# Patient Record
Sex: FEMALE | Race: WHITE | NOT HISPANIC OR LATINO
[De-identification: names, ages, dates, MRNs, and addresses within clinical notes are randomized per-mention and may not be internally consistent; named-entity substitution may affect disease eponyms.]

---

## 2018-07-29 ENCOUNTER — TRANSCRIPTION ENCOUNTER (OUTPATIENT)
Age: 73
End: 2018-07-29

## 2020-10-26 ENCOUNTER — TRANSCRIPTION ENCOUNTER (OUTPATIENT)
Age: 75
End: 2020-10-26

## 2020-10-31 ENCOUNTER — INPATIENT (INPATIENT)
Facility: HOSPITAL | Age: 75
LOS: 1 days | Discharge: HOME HEALTH | DRG: 552 | End: 2020-11-02
Attending: INTERNAL MEDICINE
Payer: COMMERCIAL

## 2020-10-31 VITALS
OXYGEN SATURATION: 98 % | HEIGHT: 63 IN | WEIGHT: 126.99 LBS | RESPIRATION RATE: 20 BRPM | DIASTOLIC BLOOD PRESSURE: 100 MMHG | HEART RATE: 81 BPM | SYSTOLIC BLOOD PRESSURE: 154 MMHG | TEMPERATURE: 99 F

## 2020-10-31 DIAGNOSIS — S32.9XXA FRACTURE OF UNSPECIFIED PARTS OF LUMBOSACRAL SPINE AND PELVIS, INITIAL ENCOUNTER FOR CLOSED FRACTURE: ICD-10-CM

## 2020-10-31 LAB
ALBUMIN SERPL ELPH-MCNC: 3.5 G/DL — SIGNIFICANT CHANGE UP (ref 3.3–5)
ALP SERPL-CCNC: 75 U/L — SIGNIFICANT CHANGE UP (ref 40–120)
ALT FLD-CCNC: 49 U/L — SIGNIFICANT CHANGE UP (ref 12–78)
ANION GAP SERPL CALC-SCNC: 8 MMOL/L — SIGNIFICANT CHANGE UP (ref 5–17)
APTT BLD: 31.6 SEC — SIGNIFICANT CHANGE UP (ref 27.5–35.5)
AST SERPL-CCNC: 40 U/L — HIGH (ref 15–37)
BASOPHILS # BLD AUTO: 0.04 K/UL — SIGNIFICANT CHANGE UP (ref 0–0.2)
BASOPHILS NFR BLD AUTO: 0.3 % — SIGNIFICANT CHANGE UP (ref 0–2)
BILIRUB SERPL-MCNC: 0.5 MG/DL — SIGNIFICANT CHANGE UP (ref 0.2–1.2)
BUN SERPL-MCNC: 15 MG/DL — SIGNIFICANT CHANGE UP (ref 7–23)
CALCIUM SERPL-MCNC: 9.4 MG/DL — SIGNIFICANT CHANGE UP (ref 8.5–10.1)
CHLORIDE SERPL-SCNC: 108 MMOL/L — SIGNIFICANT CHANGE UP (ref 96–108)
CO2 SERPL-SCNC: 26 MMOL/L — SIGNIFICANT CHANGE UP (ref 22–31)
CREAT SERPL-MCNC: 0.68 MG/DL — SIGNIFICANT CHANGE UP (ref 0.5–1.3)
EOSINOPHIL # BLD AUTO: 0.02 K/UL — SIGNIFICANT CHANGE UP (ref 0–0.5)
EOSINOPHIL NFR BLD AUTO: 0.2 % — SIGNIFICANT CHANGE UP (ref 0–6)
GLUCOSE SERPL-MCNC: 95 MG/DL — SIGNIFICANT CHANGE UP (ref 70–99)
HCT VFR BLD CALC: 40.6 % — SIGNIFICANT CHANGE UP (ref 34.5–45)
HGB BLD-MCNC: 13.5 G/DL — SIGNIFICANT CHANGE UP (ref 11.5–15.5)
IMM GRANULOCYTES NFR BLD AUTO: 0.8 % — SIGNIFICANT CHANGE UP (ref 0–1.5)
INR BLD: 1.05 RATIO — SIGNIFICANT CHANGE UP (ref 0.88–1.16)
LYMPHOCYTES # BLD AUTO: 0.84 K/UL — LOW (ref 1–3.3)
LYMPHOCYTES # BLD AUTO: 6.5 % — LOW (ref 13–44)
MCHC RBC-ENTMCNC: 30 PG — SIGNIFICANT CHANGE UP (ref 27–34)
MCHC RBC-ENTMCNC: 33.3 GM/DL — SIGNIFICANT CHANGE UP (ref 32–36)
MCV RBC AUTO: 90.2 FL — SIGNIFICANT CHANGE UP (ref 80–100)
MONOCYTES # BLD AUTO: 0.51 K/UL — SIGNIFICANT CHANGE UP (ref 0–0.9)
MONOCYTES NFR BLD AUTO: 3.9 % — SIGNIFICANT CHANGE UP (ref 2–14)
NEUTROPHILS # BLD AUTO: 11.41 K/UL — HIGH (ref 1.8–7.4)
NEUTROPHILS NFR BLD AUTO: 88.3 % — HIGH (ref 43–77)
PLATELET # BLD AUTO: 284 K/UL — SIGNIFICANT CHANGE UP (ref 150–400)
POTASSIUM SERPL-MCNC: 3.8 MMOL/L — SIGNIFICANT CHANGE UP (ref 3.5–5.3)
POTASSIUM SERPL-SCNC: 3.8 MMOL/L — SIGNIFICANT CHANGE UP (ref 3.5–5.3)
PROT SERPL-MCNC: 7.6 GM/DL — SIGNIFICANT CHANGE UP (ref 6–8.3)
PROTHROM AB SERPL-ACNC: 12.2 SEC — SIGNIFICANT CHANGE UP (ref 10.6–13.6)
RBC # BLD: 4.5 M/UL — SIGNIFICANT CHANGE UP (ref 3.8–5.2)
RBC # FLD: 12.6 % — SIGNIFICANT CHANGE UP (ref 10.3–14.5)
SARS-COV-2 RNA SPEC QL NAA+PROBE: SIGNIFICANT CHANGE UP
SODIUM SERPL-SCNC: 142 MMOL/L — SIGNIFICANT CHANGE UP (ref 135–145)
TROPONIN I SERPL-MCNC: <0.015 NG/ML — SIGNIFICANT CHANGE UP (ref 0.01–0.04)
WBC # BLD: 12.92 K/UL — HIGH (ref 3.8–10.5)
WBC # FLD AUTO: 12.92 K/UL — HIGH (ref 3.8–10.5)

## 2020-10-31 PROCEDURE — 97162 PT EVAL MOD COMPLEX 30 MIN: CPT | Mod: GP

## 2020-10-31 PROCEDURE — 84100 ASSAY OF PHOSPHORUS: CPT

## 2020-10-31 PROCEDURE — 85025 COMPLETE CBC W/AUTO DIFF WBC: CPT

## 2020-10-31 PROCEDURE — 97116 GAIT TRAINING THERAPY: CPT | Mod: GP

## 2020-10-31 PROCEDURE — 36415 COLL VENOUS BLD VENIPUNCTURE: CPT

## 2020-10-31 PROCEDURE — 97530 THERAPEUTIC ACTIVITIES: CPT | Mod: GP

## 2020-10-31 PROCEDURE — 86769 SARS-COV-2 COVID-19 ANTIBODY: CPT

## 2020-10-31 PROCEDURE — 84484 ASSAY OF TROPONIN QUANT: CPT

## 2020-10-31 PROCEDURE — 72192 CT PELVIS W/O DYE: CPT | Mod: 26

## 2020-10-31 PROCEDURE — 83735 ASSAY OF MAGNESIUM: CPT

## 2020-10-31 PROCEDURE — 80053 COMPREHEN METABOLIC PANEL: CPT

## 2020-10-31 PROCEDURE — 93005 ELECTROCARDIOGRAM TRACING: CPT

## 2020-10-31 PROCEDURE — 99223 1ST HOSP IP/OBS HIGH 75: CPT | Mod: AI

## 2020-10-31 PROCEDURE — 71045 X-RAY EXAM CHEST 1 VIEW: CPT | Mod: 26

## 2020-10-31 PROCEDURE — 76376 3D RENDER W/INTRP POSTPROCES: CPT | Mod: 26

## 2020-10-31 PROCEDURE — 73502 X-RAY EXAM HIP UNI 2-3 VIEWS: CPT | Mod: 26,RT

## 2020-10-31 PROCEDURE — 73552 X-RAY EXAM OF FEMUR 2/>: CPT | Mod: 26,RT

## 2020-10-31 RX ORDER — LEVOTHYROXINE SODIUM 125 MCG
75 TABLET ORAL DAILY
Refills: 0 | Status: DISCONTINUED | OUTPATIENT
Start: 2020-10-31 | End: 2020-11-02

## 2020-10-31 RX ORDER — LEVOTHYROXINE SODIUM 125 MCG
1 TABLET ORAL
Qty: 0 | Refills: 0 | DISCHARGE

## 2020-10-31 RX ORDER — ONDANSETRON 8 MG/1
4 TABLET, FILM COATED ORAL ONCE
Refills: 0 | Status: COMPLETED | OUTPATIENT
Start: 2020-10-31 | End: 2020-10-31

## 2020-10-31 RX ORDER — ENOXAPARIN SODIUM 100 MG/ML
40 INJECTION SUBCUTANEOUS DAILY
Refills: 0 | Status: DISCONTINUED | OUTPATIENT
Start: 2020-10-31 | End: 2020-11-02

## 2020-10-31 RX ORDER — SODIUM CHLORIDE 9 MG/ML
1000 INJECTION INTRAMUSCULAR; INTRAVENOUS; SUBCUTANEOUS ONCE
Refills: 0 | Status: COMPLETED | OUTPATIENT
Start: 2020-10-31 | End: 2020-10-31

## 2020-10-31 RX ORDER — HYDROMORPHONE HYDROCHLORIDE 2 MG/ML
0.5 INJECTION INTRAMUSCULAR; INTRAVENOUS; SUBCUTANEOUS ONCE
Refills: 0 | Status: DISCONTINUED | OUTPATIENT
Start: 2020-10-31 | End: 2020-10-31

## 2020-10-31 RX ORDER — ACETAMINOPHEN 500 MG
1000 TABLET ORAL EVERY 8 HOURS
Refills: 0 | Status: DISCONTINUED | OUTPATIENT
Start: 2020-10-31 | End: 2020-11-02

## 2020-10-31 RX ADMIN — HYDROMORPHONE HYDROCHLORIDE 0.5 MILLIGRAM(S): 2 INJECTION INTRAMUSCULAR; INTRAVENOUS; SUBCUTANEOUS at 13:05

## 2020-10-31 RX ADMIN — HYDROMORPHONE HYDROCHLORIDE 0.5 MILLIGRAM(S): 2 INJECTION INTRAMUSCULAR; INTRAVENOUS; SUBCUTANEOUS at 12:50

## 2020-10-31 RX ADMIN — SODIUM CHLORIDE 1000 MILLILITER(S): 9 INJECTION INTRAMUSCULAR; INTRAVENOUS; SUBCUTANEOUS at 12:50

## 2020-10-31 RX ADMIN — Medication 1000 MILLIGRAM(S): at 20:05

## 2020-10-31 RX ADMIN — Medication 1000 MILLIGRAM(S): at 20:06

## 2020-10-31 RX ADMIN — SODIUM CHLORIDE 1000 MILLILITER(S): 9 INJECTION INTRAMUSCULAR; INTRAVENOUS; SUBCUTANEOUS at 13:50

## 2020-10-31 RX ADMIN — ONDANSETRON 4 MILLIGRAM(S): 8 TABLET, FILM COATED ORAL at 12:51

## 2020-10-31 RX ADMIN — ENOXAPARIN SODIUM 40 MILLIGRAM(S): 100 INJECTION SUBCUTANEOUS at 20:06

## 2020-10-31 NOTE — ED PROVIDER NOTE - MUSCULOSKELETAL, MLM
Spine appears normal, range of motion is not limited, no muscle or joint tenderness Spine appears normal, range of motion is not limited, +tender right hip, no LS tenderness

## 2020-10-31 NOTE — CONSULT NOTE ADULT - SUBJECTIVE AND OBJECTIVE BOX
Patient is a 75yFemale community ambulator with assistive devices who presents to ED w/ a c/o of right hip pain. Patient states she slipped on ice walking her dog this morning. Denies HH/LOC. States ability/inability to walk immediately following the injury. Denies any numbness or tingling. Denies having any other pain elsewhere. Denies any previous orthopedic history. No other orthopedic concerns at this time.            penicillins (Unknown)      PHYSICAL EXAM:  T(C): 37 (10-31-20 @ 15:31), Max: 37.1 (10-31-20 @ 11:48)  HR: 70 (10-31-20 @ 15:31) (70 - 81)  BP: 114/67 (10-31-20 @ 15:31) (114/67 - 154/100)  RR: 17 (10-31-20 @ 15:31) (17 - 20)  SpO2: 100% (10-31-20 @ 15:31) (98% - 100%)    Gen: NAD, Resting comfortably    RLE  Skin intact, no erythema or ecchymosis  No bony tenderness to palpation  +EHL/FHL/TA/GSC  +SILT L3-S1  + DP  Compartments soft and compressible  No calf tenderness    Secondary Exam: Benign, Skin intact, NTTP along axial spine, SILT throughout, motor grossly intact throughout, no other orthopedic injuries at this time, compartments soft and compressible    xrays show right inferior pubic rami fracture minimally displaced, and right ilium sclerotic lesion      A/P: 75F w/ nondisplaced inferior pubic rami fx and incidental sclerotic appearing lesion      Analgesia  WBAT  DVT ppx  PT/OT  recommend out patient workup for sclerotic lesion with no acute orthopedic surgical intervention indicated at this time  please call office for an appointment  Orthopedically stable  Discussed with attending who is in agreement with above plan   Patient is a 75yFemale community ambulator with assistive devices who presents to ED w/ a c/o of right hip pain. Patient states she slipped on ice walking her dog this morning. Denies HH/LOC. States ability/inability to walk immediately following the injury. Denies any numbness or tingling. Denies having any other pain elsewhere. Denies any previous orthopedic history. No other orthopedic concerns at this time.            penicillins (Unknown)      PHYSICAL EXAM:  T(C): 37 (10-31-20 @ 15:31), Max: 37.1 (10-31-20 @ 11:48)  HR: 70 (10-31-20 @ 15:31) (70 - 81)  BP: 114/67 (10-31-20 @ 15:31) (114/67 - 154/100)  RR: 17 (10-31-20 @ 15:31) (17 - 20)  SpO2: 100% (10-31-20 @ 15:31) (98% - 100%)    Gen: NAD, Resting comfortably    RLE  Skin intact, no erythema or ecchymosis  No bony tenderness to palpation  +EHL/FHL/TA/GSC  +SILT L3-S1  + DP  Compartments soft and compressible  No calf tenderness    Secondary Exam: Benign, Skin intact, NTTP along axial spine, SILT throughout, motor grossly intact throughout, no other orthopedic injuries at this time, compartments soft and compressible    xrays show right inferior pubic rami fracture minimally displaced, right sacral insufficiency fx and right ilium sclerotic lesion      A/P: 75F w/ nondisplaced right LC1 fx and incidental sclerotic appearing lesion      Analgesia  WBAT  DVT ppx  PT/OT  recommend out patient workup for sclerotic lesion with no acute orthopedic surgical intervention indicated at this time  please call office for an appointment  Orthopedically stable  Discussed with attending who is in agreement with above plan

## 2020-10-31 NOTE — H&P ADULT - NSHPPHYSICALEXAM_GEN_ALL_CORE
Objective:    Vitals:  T(C): 37 (10-31-20 @ 15:31), Max: 37.1 (10-31-20 @ 11:48)  HR: 70 (10-31-20 @ 15:31) (70 - 81)  BP: 114/67 (10-31-20 @ 15:31) (114/67 - 154/100)  RR: 17 (10-31-20 @ 15:31) (17 - 20)  SpO2: 100% (10-31-20 @ 15:31) (98% - 100%)    Physical Exam:  General: comfortable, no acute distress, well nourished  HEENT: Atraumatic, no LAD, trachea midline, PERRLA  Cardiovascular: normal s1s2, no murmurs, gallops or fricition rubs  Pulmonary: clear to ausculation Bilaterally, no wheezing , rhonchi  Gastrointestinal: soft non tender non distended, no masses felt, no organomegally  Muscloskeletal: no lower extremity edema, intact bilateral lower extremity pulses  Neurological: CN II-12 intact. No focal weakness  Psychiatrical: normal mood, cooperative  SKIN: no rash, lesions or ulcers

## 2020-10-31 NOTE — ED ADULT NURSE NOTE - OBJECTIVE STATEMENT
c/o trip and fall on ice this morning while walking dog, patient denies hitting head, does not take anticoagulants, landed on buttock, c/o right buttock pain radiating to right groin, unable to bare weight  HX: hypothyroidism

## 2020-10-31 NOTE — H&P ADULT - NSHPLABSRESULTS_GEN_ALL_CORE
Labs:                          13.5   12.92 )-----------( 284      ( 31 Oct 2020 12:37 )             40.6     10-31    142  |  108  |  15  ----------------------------<  95  3.8   |  26  |  0.68    Ca    9.4      31 Oct 2020 12:37    TPro  7.6  /  Alb  3.5  /  TBili  0.5  /  DBili  x   /  AST  40<H>  /  ALT  49  /  AlkPhos  75  10-31    LIVER FUNCTIONS - ( 31 Oct 2020 12:37 )  Alb: 3.5 g/dL / Pro: 7.6 gm/dL / ALK PHOS: 75 U/L / ALT: 49 U/L / AST: 40 U/L / GGT: x           PT/INR - ( 31 Oct 2020 12:37 )   PT: 12.2 sec;   INR: 1.05 ratio         PTT - ( 31 Oct 2020 12:37 )  PTT:31.6 sec      Active Medications  MEDICATIONS  (STANDING):  enoxaparin Injectable 40 milliGRAM(s) SubCutaneous daily  levothyroxine 75 MICROGram(s) Oral daily    MEDICATIONS  (PRN):

## 2020-10-31 NOTE — ED PROVIDER NOTE - CLINICAL SUMMARY MEDICAL DECISION MAKING FREE TEXT BOX
Pt with history of thyroid disease here after mechanical fall from standing height onto buttocks, hip. Pt unable to walk after fall at 7AM. XR, pain medications, labs, ortho consult probably.

## 2020-10-31 NOTE — CONSULT NOTE ADULT - CONSULT REQUESTED BY NAME
ED Detail Level: Detailed Post-Care Instructions: I reviewed with the patient in detail post-care instructions. Patient is to wear sunprotection, and avoid picking at any of the treated lesions. Pt may apply Vaseline to crusted or scabbing areas. Render Note In Bullet Format When Appropriate: No Number Of Freeze-Thaw Cycles: 2 freeze-thaw cycles Consent: The patient's consent was obtained including but not limited to risks of crusting, scabbing, blistering, scarring, darker or lighter pigmentary change, recurrence, incomplete removal and infection. Render Post-Care Instructions In Note?: yes Duration Of Freeze Thaw-Cycle (Seconds): 3

## 2020-10-31 NOTE — ED ADULT NURSE NOTE - NSIMPLEMENTINTERV_GEN_ALL_ED
Implemented All Fall Risk Interventions:  [place] to call system. Call bell, personal items and telephone within reach. Instruct patient to call for assistance. Room bathroom lighting operational. Non-slip footwear when patient is off stretcher. Physically safe environment: no spills, clutter or unnecessary equipment. Stretcher in lowest position, wheels locked, appropriate side rails in place. Provide visual cue, wrist band, yellow gown, etc. Monitor gait and stability. Monitor for mental status changes and reorient to person, place, and time. Review medications for side effects contributing to fall risk. Reinforce activity limits and safety measures with patient and family.

## 2020-10-31 NOTE — ED PROVIDER NOTE - OBJECTIVE STATEMENT
74 y/o female with PMHx of hypothyroidism presents to the ED s/p trip and fall at 7:15 while taking her dog out. Pt fell backwards, slipping onto ice, c/o back pain, radiating to buttock and groin, unable to ambulate s/p fall. No use of blood thinners. Unable to bear weight at this time. Denies LOC, head trauma. Pt lives at home with daughter and son-in-law. PMD: Dr. Fernandez, cardiologist: Dr. Martel. 76 y/o female with PMHx of hypothyroidism presents to the ED s/p trip and fall at 7:15 while taking her dog out. Pt fell backwards, slipping onto ice, c/o back pain, radiating to buttock and groin, unable to ambulate s/p fall. No use of blood thinners. Unable to bear weight at this time. Denies LOC, head trauma. Pt lives at home with daughter and son-in-law. PMD: Dr. Fernandez, cardiologist: Dr. Martel. Non-smoker, drinker, drug user.

## 2020-10-31 NOTE — ED ADULT TRIAGE NOTE - CHIEF COMPLAINT QUOTE
PT A/OX3, bibems from home c/o "I tripped and fell, I landed on my butt and back". pt denies LOC/HS, denies taking blood thinners. pt reports unable to ambulate s/p fall due to pain in buttocks.

## 2020-10-31 NOTE — ED ADULT TRIAGE NOTE - NS ED NURSE BANDS TYPE
Problem: Falls - Risk of:  Goal: Will remain free from falls  Description: Will remain free from falls  Outcome: Ongoing  Goal: Absence of physical injury  Description: Absence of physical injury  Outcome: Ongoing     Problem: Nutrition  Goal: Optimal nutrition therapy  Outcome: Ongoing     Problem: Mobility - Impaired:  Goal: Mobility will improve  Description: Mobility will improve  Outcome: Ongoing     Problem: Pain:  Goal: Pain level will decrease  Description: Pain level will decrease  Outcome: Ongoing  Goal: Control of acute pain  Description: Control of acute pain  Outcome: Ongoing  Goal: Control of chronic pain  Description: Control of chronic pain  Outcome: Ongoing Name band;

## 2020-10-31 NOTE — H&P ADULT - HISTORY OF PRESENT ILLNESS
74 y/o female with PMHx of hypothyroidism presents to the ED s/p trip and fall at 7:15 while taking her dog out. Pt fell backwards, slipping onto ice, c/o back pain, radiating to buttock and groin, unable to ambulate s/p fall. No use of blood thinners. Unable to bear weight at this time. Denies LOC, head trauma. Pt lives at home with daughter and son-in-law. PMD: Dr. Fernandez, cardiologist: Dr. Martel. Non-smoker, drinker, drug user.    ER course  Vitals  Imaging  labs  Medications: zofran 4mg IV x 1 , Dilaudid .5iv x 1 , NS 1liter

## 2020-11-01 LAB
ALBUMIN SERPL ELPH-MCNC: 2.8 G/DL — LOW (ref 3.3–5)
ALP SERPL-CCNC: 61 U/L — SIGNIFICANT CHANGE UP (ref 40–120)
ALT FLD-CCNC: 33 U/L — SIGNIFICANT CHANGE UP (ref 12–78)
ANION GAP SERPL CALC-SCNC: 4 MMOL/L — LOW (ref 5–17)
AST SERPL-CCNC: 21 U/L — SIGNIFICANT CHANGE UP (ref 15–37)
BASOPHILS # BLD AUTO: 0.05 K/UL — SIGNIFICANT CHANGE UP (ref 0–0.2)
BASOPHILS NFR BLD AUTO: 0.7 % — SIGNIFICANT CHANGE UP (ref 0–2)
BILIRUB SERPL-MCNC: 0.6 MG/DL — SIGNIFICANT CHANGE UP (ref 0.2–1.2)
BUN SERPL-MCNC: 19 MG/DL — SIGNIFICANT CHANGE UP (ref 7–23)
CALCIUM SERPL-MCNC: 8.8 MG/DL — SIGNIFICANT CHANGE UP (ref 8.5–10.1)
CHLORIDE SERPL-SCNC: 109 MMOL/L — HIGH (ref 96–108)
CO2 SERPL-SCNC: 28 MMOL/L — SIGNIFICANT CHANGE UP (ref 22–31)
CREAT SERPL-MCNC: 0.76 MG/DL — SIGNIFICANT CHANGE UP (ref 0.5–1.3)
EOSINOPHIL # BLD AUTO: 0.12 K/UL — SIGNIFICANT CHANGE UP (ref 0–0.5)
EOSINOPHIL NFR BLD AUTO: 1.7 % — SIGNIFICANT CHANGE UP (ref 0–6)
GLUCOSE SERPL-MCNC: 89 MG/DL — SIGNIFICANT CHANGE UP (ref 70–99)
HCT VFR BLD CALC: 35.4 % — SIGNIFICANT CHANGE UP (ref 34.5–45)
HCV AB S/CO SERPL IA: 0.09 S/CO — SIGNIFICANT CHANGE UP (ref 0–0.99)
HCV AB SERPL-IMP: SIGNIFICANT CHANGE UP
HGB BLD-MCNC: 11.7 G/DL — SIGNIFICANT CHANGE UP (ref 11.5–15.5)
IMM GRANULOCYTES NFR BLD AUTO: 0.6 % — SIGNIFICANT CHANGE UP (ref 0–1.5)
LYMPHOCYTES # BLD AUTO: 1.25 K/UL — SIGNIFICANT CHANGE UP (ref 1–3.3)
LYMPHOCYTES # BLD AUTO: 17.7 % — SIGNIFICANT CHANGE UP (ref 13–44)
MAGNESIUM SERPL-MCNC: 2.2 MG/DL — SIGNIFICANT CHANGE UP (ref 1.6–2.6)
MCHC RBC-ENTMCNC: 30 PG — SIGNIFICANT CHANGE UP (ref 27–34)
MCHC RBC-ENTMCNC: 33.1 GM/DL — SIGNIFICANT CHANGE UP (ref 32–36)
MCV RBC AUTO: 90.8 FL — SIGNIFICANT CHANGE UP (ref 80–100)
MONOCYTES # BLD AUTO: 0.43 K/UL — SIGNIFICANT CHANGE UP (ref 0–0.9)
MONOCYTES NFR BLD AUTO: 6.1 % — SIGNIFICANT CHANGE UP (ref 2–14)
NEUTROPHILS # BLD AUTO: 5.16 K/UL — SIGNIFICANT CHANGE UP (ref 1.8–7.4)
NEUTROPHILS NFR BLD AUTO: 73.2 % — SIGNIFICANT CHANGE UP (ref 43–77)
PHOSPHATE SERPL-MCNC: 2.9 MG/DL — SIGNIFICANT CHANGE UP (ref 2.5–4.5)
PLATELET # BLD AUTO: 252 K/UL — SIGNIFICANT CHANGE UP (ref 150–400)
POTASSIUM SERPL-MCNC: 3.7 MMOL/L — SIGNIFICANT CHANGE UP (ref 3.5–5.3)
POTASSIUM SERPL-SCNC: 3.7 MMOL/L — SIGNIFICANT CHANGE UP (ref 3.5–5.3)
PROT SERPL-MCNC: 6.4 GM/DL — SIGNIFICANT CHANGE UP (ref 6–8.3)
RBC # BLD: 3.9 M/UL — SIGNIFICANT CHANGE UP (ref 3.8–5.2)
RBC # FLD: 13 % — SIGNIFICANT CHANGE UP (ref 10.3–14.5)
SARS-COV-2 IGG SERPL QL IA: NEGATIVE — SIGNIFICANT CHANGE UP
SARS-COV-2 IGM SERPL IA-ACNC: 0.08 INDEX — SIGNIFICANT CHANGE UP
SODIUM SERPL-SCNC: 141 MMOL/L — SIGNIFICANT CHANGE UP (ref 135–145)
WBC # BLD: 7.05 K/UL — SIGNIFICANT CHANGE UP (ref 3.8–10.5)
WBC # FLD AUTO: 7.05 K/UL — SIGNIFICANT CHANGE UP (ref 3.8–10.5)

## 2020-11-01 PROCEDURE — 93010 ELECTROCARDIOGRAM REPORT: CPT

## 2020-11-01 PROCEDURE — 99232 SBSQ HOSP IP/OBS MODERATE 35: CPT

## 2020-11-01 RX ORDER — KETOROLAC TROMETHAMINE 30 MG/ML
30 SYRINGE (ML) INJECTION EVERY 6 HOURS
Refills: 0 | Status: DISCONTINUED | OUTPATIENT
Start: 2020-11-01 | End: 2020-11-02

## 2020-11-01 RX ADMIN — Medication 1000 MILLIGRAM(S): at 21:00

## 2020-11-01 RX ADMIN — ENOXAPARIN SODIUM 40 MILLIGRAM(S): 100 INJECTION SUBCUTANEOUS at 10:51

## 2020-11-01 RX ADMIN — Medication 30 MILLIGRAM(S): at 10:50

## 2020-11-01 RX ADMIN — Medication 1000 MILLIGRAM(S): at 05:47

## 2020-11-01 RX ADMIN — Medication 30 MILLIGRAM(S): at 00:59

## 2020-11-01 RX ADMIN — Medication 30 MILLIGRAM(S): at 17:33

## 2020-11-01 RX ADMIN — Medication 1000 MILLIGRAM(S): at 14:38

## 2020-11-01 RX ADMIN — Medication 30 MILLIGRAM(S): at 11:04

## 2020-11-01 RX ADMIN — Medication 1000 MILLIGRAM(S): at 14:37

## 2020-11-01 RX ADMIN — Medication 30 MILLIGRAM(S): at 17:45

## 2020-11-01 RX ADMIN — Medication 30 MILLIGRAM(S): at 00:34

## 2020-11-01 RX ADMIN — Medication 75 MICROGRAM(S): at 05:47

## 2020-11-01 NOTE — PROGRESS NOTE ADULT - SUBJECTIVE AND OBJECTIVE BOX
C/c: mechanical fall/inability to ambulate.    HPI:   76 y/o female with PMHx of hypothyroidism presented to the ER s/p mechanical  fall at 7:15 while taking her dog out. Pt fell backwards, slipping onto ice, c/o back pain, radiating to buttock and groin, unable to ambulate s/p fall. She was admitted with right superior/inf pubic rami and sacral fractures.     11/1: pt seen and examined this am. Felt well. Hadn't been out of bed yet. no pain at rest. no sob/chest pain.     Review of system- All 10 systems reviewed and is as per HPI otherwise negative.     VITALS  T(C): 36.7 (11-01-20 @ 09:28), Max: 37.2 (10-31-20 @ 18:55)  HR: 67 (11-01-20 @ 09:28) (65 - 81)  BP: 119/66 (11-01-20 @ 09:28) (112/59 - 154/100)  RR: 16 (11-01-20 @ 09:28) (16 - 20)  SpO2: 100% (11-01-20 @ 09:28) (97% - 100%)    PHYSICAL EXAM:    GENERAL: Comfortable, no acute distress  HEAD:  Atraumatic, Normocephalic  EYES: EOMI, PERRLA  HEENT: Moist mucous membranes  NECK: Supple, No JVD  NERVOUS SYSTEM:  Alert & Oriented X3, Motor Strength 5/5 B/L upper and lower extremities  CHEST/LUNG: Clear to auscultation bilaterally  HEART: Regular rate and rhythm; No murmurs, rubs, or gallops  ABDOMEN: Soft, Nontender, Nondistended; Bowel sounds present  GENITOURINARY- Voiding, no palpable bladder  EXTREMITIES:  No clubbing, cyanosis, or edema  MUSCULOSKELETAL- No muscle tenderness, No joint tenderness  SKIN-no rash        LABS:                        11.7   7.05  )-----------( 252                35.4     11-01    141  |  109<H>  |  19  ----------------------------<  89  3.7   |  28  |  0.76    Ca    8.8      01 Nov 2020 06:45  Phos  2.9     11-01  Mg     2.2     11-01    TPro  6.4  /  Alb  2.8<L>  /  TBili  0.6  /  DBili  x   /  AST  21  /  ALT  33  /  AlkPhos  61  11-01    PT/INR - ( 31 Oct 2020 12:37 )   PT: 12.2 sec;   INR: 1.05 ratio         PTT - ( 31 Oct 2020 12:37 )  PTT:31.6 sec      CARDIAC MARKERS ( 31 Oct 2020 19:55 )  <0.015 ng/mL / x     / x     / x     / x          MEDS  acetaminophen   Tablet .. 1000 milliGRAM(s) Oral every 8 hours  enoxaparin Injectable 40 milliGRAM(s) SubCutaneous daily  ketorolac   Injectable 30 milliGRAM(s) IV Push every 6 hours PRN  levothyroxine 75 MICROGram(s) Oral daily    ASSESSMENT AND PLAN:  75F, PMH as above a/w:    1. Mechanical fall/superior+inferior pubic rami+sacral fractures:  -pain control  -physical therapy  -ortho eval appreciated  -incentive spirometry  -bowel regimen.     2. Hypothyroidism:  -synthroid    3. Sclerotic lesion right ileum:  -outpt f/u with ortho     4. DVT px.   -lovenox.

## 2020-11-01 NOTE — PHYSICAL THERAPY INITIAL EVALUATION ADULT - PERTINENT HX OF CURRENT PROBLEM, REHAB EVAL
Patient is a 75yFemale community ambulator with assistive devices. xrays show right inferior pubic rami fracture minimally displaced, right sacral insufficiency fx and right ilium sclerotic lesion no acute orthopedic surgical intervention indicated at this time

## 2020-11-01 NOTE — PHYSICAL THERAPY INITIAL EVALUATION ADULT - ADDITIONAL COMMENTS
pt. lives in an apt attached to house with her dtr and son in-law. Pt. was Ind with all ADL's, with NAD, + drive and works full time as principle assistance. Pt. has 3 step to enter her apt and 5 inside (-) HR.

## 2020-11-01 NOTE — PHYSICAL THERAPY INITIAL EVALUATION ADULT - PLANNED THERAPY INTERVENTIONS, PT EVAL
bed mobility training/postural re-education/neuromuscular re-education/strengthening/stretching/transfer training

## 2020-11-01 NOTE — PHYSICAL THERAPY INITIAL EVALUATION ADULT - IMPAIRMENTS FOUND, PT EVAL
muscle strength/ROM/gait, locomotion, and balance/neuromotor development and sensory integration/aerobic capacity/endurance

## 2020-11-01 NOTE — PHYSICAL THERAPY INITIAL EVALUATION ADULT - CRITERIA FOR SKILLED THERAPEUTIC INTERVENTIONS
anticipated discharge recommendation/therapy frequency/anticipated equipment needs at discharge/risk reduction/prevention/rehab potential/predicted duration of therapy intervention/impairments found/functional limitations in following categories

## 2020-11-02 ENCOUNTER — TRANSCRIPTION ENCOUNTER (OUTPATIENT)
Age: 75
End: 2020-11-02

## 2020-11-02 VITALS
TEMPERATURE: 98 F | OXYGEN SATURATION: 99 % | HEART RATE: 68 BPM | SYSTOLIC BLOOD PRESSURE: 135 MMHG | RESPIRATION RATE: 16 BRPM | DIASTOLIC BLOOD PRESSURE: 66 MMHG

## 2020-11-02 PROCEDURE — 99239 HOSP IP/OBS DSCHRG MGMT >30: CPT

## 2020-11-02 RX ORDER — ACETAMINOPHEN 500 MG
2 TABLET ORAL
Qty: 0 | Refills: 0 | DISCHARGE

## 2020-11-02 RX ORDER — IBUPROFEN 200 MG
400 TABLET ORAL EVERY 6 HOURS
Refills: 0 | Status: DISCONTINUED | OUTPATIENT
Start: 2020-11-02 | End: 2020-11-02

## 2020-11-02 RX ORDER — IBUPROFEN 200 MG
1 TABLET ORAL
Qty: 0 | Refills: 0 | DISCHARGE
Start: 2020-11-02

## 2020-11-02 RX ADMIN — ENOXAPARIN SODIUM 40 MILLIGRAM(S): 100 INJECTION SUBCUTANEOUS at 10:30

## 2020-11-02 RX ADMIN — Medication 1000 MILLIGRAM(S): at 13:26

## 2020-11-02 RX ADMIN — Medication 1000 MILLIGRAM(S): at 06:27

## 2020-11-02 RX ADMIN — Medication 30 MILLIGRAM(S): at 07:00

## 2020-11-02 RX ADMIN — Medication 1000 MILLIGRAM(S): at 13:19

## 2020-11-02 RX ADMIN — Medication 1000 MILLIGRAM(S): at 06:26

## 2020-11-02 RX ADMIN — Medication 75 MICROGRAM(S): at 06:26

## 2020-11-02 RX ADMIN — Medication 400 MILLIGRAM(S): at 15:58

## 2020-11-02 RX ADMIN — Medication 30 MILLIGRAM(S): at 06:45

## 2020-11-02 NOTE — DISCHARGE NOTE PROVIDER - NSDCMRMEDTOKEN_GEN_ALL_CORE_FT
acetaminophen 500 mg oral tablet: 2 tab(s) orally every 6 hours, As Needed for pain. max dose 4g in 24 hours.  ibuprofen 400 mg oral tablet: 1 tab(s) orally every 6 hours, As needed, Moderate Pain (4 - 6)  Synthroid 75 mcg (0.075 mg) oral tablet: 1 tab(s) orally once a day

## 2020-11-02 NOTE — DISCHARGE NOTE PROVIDER - HOSPITAL COURSE
76 y/o female with PMHx of hypothyroidism presented to the ER s/p mechanical  fall at 7:15 while taking her dog out. Pt fell backwards, slipping onto ice, c/o back pain, radiating to buttock and groin, unable to ambulate s/p fall. She was admitted with right superior/inf pubic rami and sacral fractures. Seen by ortho, pain control/physical therapy was recommended. She was incidentally noted to have a sclerotic lesion of her right ilium for which was recommended outpt f/u. She was seen by physical therapy, did well. Her pain is controlled and she will be discharged home.     Vital Signs Last 24 Hrs  T(C): 36.7 (02 Nov 2020 08:44), Max: 36.7 (02 Nov 2020 08:44)  T(F): 98 (02 Nov 2020 08:44), Max: 98 (02 Nov 2020 08:44)  HR: 68 (02 Nov 2020 08:44) (63 - 68)  BP: 135/66 (02 Nov 2020 08:44) (135/66 - 139/58)  RR: 16 (02 Nov 2020 08:44) (16 - 16)  SpO2: 99% (02 Nov 2020 08:44) (98% - 99%)      PHYSICAL EXAM:    GENERAL: Comfortable, no acute distress  HEAD:  Atraumatic, Normocephalic  EYES: EOMI, PERRLA  HEENT: Moist mucous membranes  NECK: Supple, No JVD  NERVOUS SYSTEM:  Alert & Oriented X3, Motor Strength 5/5 B/L upper and lower extremities  CHEST/LUNG: Clear to auscultation bilaterally  HEART: Regular rate and rhythm; No murmurs, rubs, or gallops  ABDOMEN: Soft, Nontender, Nondistended; Bowel sounds present  GENITOURINARY- Voiding, no palpable bladder  EXTREMITIES:  No clubbing, cyanosis, or edema  MUSCULOSKELETAL- No groin pain with movement.  SKIN-no rash    LABS:                        11.7   7.05  )-----------( 252      ( 01 Nov 2020 06:45 )             35.4     11-01    141  |  109<H>  |  19  ----------------------------<  89  3.7   |  28  |  0.76    Ca    8.8      01 Nov 2020 06:45  Phos  2.9     11-01  Mg     2.2     11-01    TPro  6.4  /  Alb  2.8<L>  /  TBili  0.6  /  DBili  x   /  AST  21  /  ALT  33  /  AlkPhos  61  11-01    CT Pelvis Bony Only No Cont (10.31.20 @ 14:14) >    IMPRESSION:  1. Slightly displaced right sacral insufficiency fracture.  2. Slightly displaced right inferior pubic ramus fracture and slight nondisplaced anterior cortical fracture of the superior ramus.  3. Nonspecific 1.4 cm sclerotic focus of the right ilium. Correlation with prior imaging is suggested if available to assess stability. MRI of the sacroiliac joints may be helpful for further characterization as warranted.    FINAL DIAGNOSIS:  1. Mechanical fall/superior+inferior pubic rami+sacral fractures:  2. Hypothyroidism:  3. Sclerotic lesion right Ilium    time taken for dc 42 min  d/w pt.  left message for pcp re: dc.

## 2020-11-02 NOTE — DISCHARGE NOTE NURSING/CASE MANAGEMENT/SOCIAL WORK - PATIENT PORTAL LINK FT
You can access the FollowMyHealth Patient Portal offered by [facility] by registering at the following website: [email or web address]. By joining [facility]’s FollowMyHealth portal, you will also be able to view your health information using other applications (apps) compatible with our system.

## 2020-11-02 NOTE — DISCHARGE NOTE PROVIDER - NSDCCPCAREPLAN_GEN_ALL_CORE_FT
PRINCIPAL DISCHARGE DIAGNOSIS  Diagnosis: Pelvic fracture  Assessment and Plan of Treatment: physical therapy  pain meds as needed.   follow up with orthopedics as advised for pelvic fracture and incidentally noted right sclerotic lesion found on your ilium

## 2020-11-02 NOTE — DISCHARGE NOTE PROVIDER - CARE PROVIDER_API CALL
Ignacio Carbajal  ORTHOPAEDIC SURGERY  [address]  [place] [address]  Phone: [phone]  Fax: [phone]  Follow Up Time:

## 2020-11-06 DIAGNOSIS — S32.591A OTHER SPECIFIED FRACTURE OF RIGHT PUBIS, INITIAL ENCOUNTER FOR CLOSED FRACTURE: ICD-10-CM
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
## 2020-11-06 DIAGNOSIS — E03.9 HYPOTHYROIDISM, UNSPECIFIED: ICD-10-CM
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
## 2020-11-06 DIAGNOSIS — M89.9 DISORDER OF BONE, UNSPECIFIED: ICD-10-CM

## 2020-11-06 DIAGNOSIS — S32.10XA UNSPECIFIED FRACTURE OF SACRUM, INITIAL ENCOUNTER FOR CLOSED FRACTURE: ICD-10-CM

## 2020-11-06 DIAGNOSIS — Z88.0 ALLERGY STATUS TO PENICILLIN: ICD-10-CM
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
## 2020-11-06 DIAGNOSIS — Z79.52 LONG TERM (CURRENT) USE OF SYSTEMIC STEROIDS: ICD-10-CM

## 2020-11-10 PROBLEM — E03.9 HYPOTHYROIDISM, UNSPECIFIED: Chronic | Status: ACTIVE | Noted: 2020-11-04

## 2020-11-16 PROBLEM — Z00.00 ENCOUNTER FOR PREVENTIVE HEALTH EXAMINATION: Status: ACTIVE | Noted: 2020-11-16

## 2020-11-18 ENCOUNTER — APPOINTMENT (OUTPATIENT)
Dept: ORTHOPEDIC SURGERY | Facility: CLINIC | Age: 75
End: 2020-11-18
Payer: COMMERCIAL

## 2020-11-18 VITALS
WEIGHT: 129 LBS | HEIGHT: 63 IN | DIASTOLIC BLOOD PRESSURE: 79 MMHG | HEART RATE: 67 BPM | BODY MASS INDEX: 22.86 KG/M2 | TEMPERATURE: 96.4 F | SYSTOLIC BLOOD PRESSURE: 155 MMHG

## 2020-11-18 DIAGNOSIS — Z78.9 OTHER SPECIFIED HEALTH STATUS: ICD-10-CM

## 2020-11-18 DIAGNOSIS — Z87.39 PERSONAL HISTORY OF OTHER DISEASES OF THE MUSCULOSKELETAL SYSTEM AND CONNECTIVE TISSUE: ICD-10-CM

## 2020-11-18 DIAGNOSIS — Z80.9 FAMILY HISTORY OF MALIGNANT NEOPLASM, UNSPECIFIED: ICD-10-CM

## 2020-11-18 DIAGNOSIS — Z82.61 FAMILY HISTORY OF ARTHRITIS: ICD-10-CM

## 2020-11-18 PROCEDURE — 27197 CLSD TX PELVIC RING FX: CPT

## 2020-11-18 PROCEDURE — 99203 OFFICE O/P NEW LOW 30 MIN: CPT | Mod: 25

## 2020-11-18 PROCEDURE — 72170 X-RAY EXAM OF PELVIS: CPT

## 2020-11-18 RX ORDER — LEVOTHYROXINE SODIUM 137 UG/1
TABLET ORAL
Refills: 0 | Status: ACTIVE | COMMUNITY

## 2020-11-18 RX ORDER — BACILLUS COAGULANS/INULIN 1B-250 MG
CAPSULE ORAL
Refills: 0 | Status: ACTIVE | COMMUNITY

## 2020-11-18 RX ORDER — TRAMADOL HYDROCHLORIDE 25 MG/1
TABLET, COATED ORAL
Refills: 0 | Status: ACTIVE | COMMUNITY

## 2020-11-23 NOTE — CONSULT LETTER
[Dear  ___] : Dear  [unfilled], [Consult Letter:] : I had the pleasure of evaluating your patient, [unfilled]. [Please see my note below.] : Please see my note below. [Consult Closing:] : Thank you very much for allowing me to participate in the care of this patient.  If you have any questions, please do not hesitate to contact me. [Sincerely,] : Sincerely, [FreeTextEntry3] : Naresh Coombs III, M.D.\par [facility]/mr

## 2020-11-23 NOTE — HISTORY OF PRESENT ILLNESS
[7] : the relief from treatment is 7/10 [5] : the relief from medicine is 5/10 [de-identified] : The patient comes in today status post a slip and fall on Halloween.  She was admitted to the hospital and had x-rays of the hip, which were negative.  A CAT scan of the pelvis showed an insufficiency fracture of the sacrum and right superior and inferior pubic ramus fracture.  She is feeling much better.  The patient states the onset/injury occurred on 10/31/2020, as stated above.  This injury is not work related or due to an automobile accident.  The patient states the pain is intermittent.  The patient describes the pain as throbbing and stabbing.  The patient states sitting and medication makes the pain better while exercises makes the pain worse.\par \par Pain level includes a current pain level of 4/10.\par \par Ailment Interference:  \par Daily Living:  Interferes completely\par Normal Work:  Interferes completely\par Sleep:  9/10\par Enjoyment of Life:  Interferes completely\par Climbing Stairs:  10/10\par Sports:  Interferes completely\par Extra-Curricular Activities:  Interferes completely [] : No [de-identified] : Physical therapy  [de-identified] : Acetaminophen  [de-identified] : Ibuprofen  [de-identified] : Tramadol

## 2020-11-23 NOTE — ADDENDUM
[FreeTextEntry1] : This note was written by Eufemia Mccain on 11/20/2020 acting as scribe for Naresh Coombs III, MD

## 2020-11-23 NOTE — PHYSICAL EXAM
[de-identified] : Left Hip: \par Range of Motion in Degrees:\par 	                                 Claimant:	   Normal:	\par Flexion (Active) 	                 120 	   120-degrees	\par Flexion (Passive)	                 120	   120-degrees	\par Extension (Active)	                 -30	   -30-degrees	\par Extension (Passive)	 -30	   -30-degrees	\par Abduction (Active)	                 45-50	   78-18-[id number]	\par Abduction (Passive)	 45-50	   63-35-zbeszbw	\par Adduction (Active)  	 20-30	   44-32-mqehdov	\par Adduction (Passive)	 20-30	   07-51-zojoobj	\par Internal Rotation (Active) 	 35	   35-degrees	\par Internal Rotation (Passive)	 35	   35-degrees	\par External Rotation (Active)	 45	   45-degrees	\par External Rotation (Passive)	 45	   45-degrees	\par \par No tenderness with internal or external rotation or axial load.  No tenderness to palpation over the greater trochanter.  Negative Trendelenburg.  No tenderness with resisted abduction.  No weakness to flexion, extension, abduction or adduction.  No evidence of instability.  No motor or sensory deficits.  2+ DP and PT pulses.  Skin is intact.  No scars, rashes or lesions.  \par  \par Right Hip: \par Range of Motion in Degrees:\par 	                                 Claimant:	   Normal:	\par Flexion (Active) 	                 120 	   120-degrees	\par Flexion (Passive)	                 120	   120-degrees	\par Extension (Active)	                 -30	   -30-degrees	\par Extension (Passive)	 -30	   -30-degrees	\par Abduction (Active)	                 45-50	   63-18-flppicx	\par Abduction (Passive)	 45-50	   40-64-eynlfph	\par Adduction (Active)  	 20-30	   14-21-wveveyf	\par Adduction (Passive)	 20-30	   78-73-mvzcwov	\par Internal Rotation (Active) 	 35	   35-degrees	\par Internal Rotation (Passive)	 35	   35-degrees	\par External Rotation (Active)	 45	   45-degrees	\par External Rotation (Passive)	 45	   45-degrees	\par \par No tenderness with internal or external rotation or axial load.  No tenderness to palpation over the greater trochanter.  Negative Trendelenburg.  No tenderness with resisted abduction.  No weakness to flexion, extension, abduction or adduction.  No evidence of instability.  No motor or sensory deficits.  2+ DP and PT pulses.  Skin is intact.  No scars, rashes or lesions.  \par  \par Pelvis: \par Tenderness over the superior and inferior pubic ramus on the right with tenderness in the region of the sacrum.  \par  [de-identified] : Ambulating with a slightly antalgic to antalgic gait.  Station:  Normal.  [de-identified] : General Appearance:  Well-developed, well-nourished female in no acute distress. \par  [de-identified] : Radiographs, one view of the pelvis, shows a minimally displaced fracture of the superior and inferior pubic ramus.  No obvious fractures of the sacrum.

## 2020-12-09 ENCOUNTER — APPOINTMENT (OUTPATIENT)
Dept: ORTHOPEDIC SURGERY | Facility: CLINIC | Age: 75
End: 2020-12-09
Payer: COMMERCIAL

## 2020-12-09 VITALS
TEMPERATURE: 96.6 F | WEIGHT: 127 LBS | HEIGHT: 63 IN | HEART RATE: 78 BPM | SYSTOLIC BLOOD PRESSURE: 134 MMHG | BODY MASS INDEX: 22.5 KG/M2 | DIASTOLIC BLOOD PRESSURE: 79 MMHG

## 2020-12-09 PROCEDURE — 99072 ADDL SUPL MATRL&STAF TM PHE: CPT

## 2020-12-09 PROCEDURE — 99213 OFFICE O/P EST LOW 20 MIN: CPT

## 2020-12-09 PROCEDURE — 72170 X-RAY EXAM OF PELVIS: CPT

## 2020-12-14 NOTE — HISTORY OF PRESENT ILLNESS
[de-identified] : Sheryl comes in today for the fracture of her pelvic ring.  She states she feels great.

## 2020-12-14 NOTE — PHYSICAL EXAM
[de-identified] : Pelvis:  \par Minimally tender over the superior and inferior pubic ramus.   [de-identified] : Radiographs, two views of the pelvis, show excellent position and healing.

## 2020-12-14 NOTE — ADDENDUM
[FreeTextEntry1] : This note was written by Eufemia Mccain on 12/10/2020 acting as scribe for Naresh Coombs III, MD

## 2020-12-14 NOTE — DISCUSSION/SUMMARY
[de-identified] : At this time, I have instructed her on home therapeutic modalities for the fracture of the pelvic ring.  She will follow up with me on an as needed basis.

## 2021-01-06 ENCOUNTER — APPOINTMENT (OUTPATIENT)
Dept: ORTHOPEDIC SURGERY | Facility: CLINIC | Age: 76
End: 2021-01-06
Payer: COMMERCIAL

## 2021-01-06 PROCEDURE — 99441: CPT

## 2021-01-24 ENCOUNTER — TRANSCRIPTION ENCOUNTER (OUTPATIENT)
Age: 76
End: 2021-01-24

## 2021-09-07 ENCOUNTER — TRANSCRIPTION ENCOUNTER (OUTPATIENT)
Age: 76
End: 2021-09-07

## 2021-10-14 NOTE — PATIENT PROFILE ADULT - FALL HARM RISK TYPE OF ASSESSMENT
Patient discharged. Patient was given discharge instructions. Patient verbalized understanding of discharge instructions. admission

## 2023-01-05 ENCOUNTER — APPOINTMENT (OUTPATIENT)
Dept: UROLOGY | Facility: CLINIC | Age: 78
End: 2023-01-05
Payer: MEDICARE

## 2023-01-05 VITALS
HEART RATE: 72 BPM | SYSTOLIC BLOOD PRESSURE: 143 MMHG | OXYGEN SATURATION: 98 % | WEIGHT: 120 LBS | BODY MASS INDEX: 21.26 KG/M2 | HEIGHT: 63 IN | DIASTOLIC BLOOD PRESSURE: 82 MMHG

## 2023-01-05 PROCEDURE — 99244 OFF/OP CNSLTJ NEW/EST MOD 40: CPT

## 2023-01-05 PROCEDURE — 99204 OFFICE O/P NEW MOD 45 MIN: CPT

## 2023-01-06 LAB
APPEARANCE: CLEAR
BACTERIA: NEGATIVE
BILIRUBIN URINE: NEGATIVE
BLOOD URINE: NEGATIVE
COLOR: NORMAL
GLUCOSE QUALITATIVE U: NEGATIVE
HYALINE CASTS: 0 /LPF
KETONES URINE: NEGATIVE
LEUKOCYTE ESTERASE URINE: NEGATIVE
MICROSCOPIC-UA: NORMAL
NITRITE URINE: NEGATIVE
PH URINE: 6.5
PROTEIN URINE: NEGATIVE
RED BLOOD CELLS URINE: 0 /HPF
SPECIFIC GRAVITY URINE: 1.01
SQUAMOUS EPITHELIAL CELLS: 0 /HPF
UROBILINOGEN URINE: NORMAL
WHITE BLOOD CELLS URINE: 0 /HPF

## 2023-01-06 NOTE — REVIEW OF SYSTEMS
[Chills] : chills [Dry Eyes] : dryness of the eyes [Urine Infection (bladder/kidney)] : bladder/kidney infection [Pain during urination] : pain during urination [Blood in urine that you can see] : blood visible in urine [Leakage of urine with straining, coughing, laughing] : leakage of urine with straining, coughing, laughing [Joint Pain] : joint pain [Joint Swelling] : joint swelling [Itching] : itching [Negative] : Heme/Lymph [see HPI] : see HPI

## 2023-01-06 NOTE — HISTORY OF PRESENT ILLNESS
[FreeTextEntry1] : 77F presents today with a CC of intermittent gross hematuria x3 over the past 6 weeks. She has minimal symptoms with mild dysuria but otherwise has nothing else to report. She has no  problems in the past.

## 2023-01-06 NOTE — END OF VISIT
[FreeTextEntry3] : A CT scan of the abdomen of the pelvis with and without IV contrast is ordered. Urine is sent for culture anlysis and cytology. She will follow up with cystourethroscopy.

## 2023-01-07 LAB — BACTERIA UR CULT: NORMAL

## 2023-01-11 LAB — URINE CYTOLOGY: NORMAL

## 2023-01-13 ENCOUNTER — NON-APPOINTMENT (OUTPATIENT)
Age: 78
End: 2023-01-13

## 2023-02-09 NOTE — DISCHARGE NOTE PROVIDER - NSDCCONDITION_GEN_ALL_CORE
Detail Level: Simple Patient Specific Counseling (Will Not Stick From Patient To Patient): If cyst is bothersome, pt to have excision performed by Dr Oscar Liz Stable

## 2023-04-18 ENCOUNTER — NON-APPOINTMENT (OUTPATIENT)
Age: 78
End: 2023-04-18

## 2023-04-19 RX ORDER — DIAZEPAM 5 MG/1
5 TABLET ORAL
Qty: 1 | Refills: 0 | Status: ACTIVE | COMMUNITY
Start: 2023-04-18 | End: 1900-01-01

## 2023-05-10 ENCOUNTER — APPOINTMENT (OUTPATIENT)
Dept: UROLOGY | Facility: CLINIC | Age: 78
End: 2023-05-10
Payer: MEDICARE

## 2023-05-10 VITALS
SYSTOLIC BLOOD PRESSURE: 120 MMHG | RESPIRATION RATE: 14 BRPM | OXYGEN SATURATION: 97 % | WEIGHT: 130 LBS | HEIGHT: 63 IN | TEMPERATURE: 97.2 F | BODY MASS INDEX: 23.04 KG/M2 | DIASTOLIC BLOOD PRESSURE: 79 MMHG | HEART RATE: 72 BPM

## 2023-05-10 PROCEDURE — 52000 CYSTOURETHROSCOPY: CPT

## 2023-05-26 ENCOUNTER — APPOINTMENT (OUTPATIENT)
Dept: ORTHOPEDIC SURGERY | Facility: CLINIC | Age: 78
End: 2023-05-26
Payer: MEDICARE

## 2023-05-26 VITALS
DIASTOLIC BLOOD PRESSURE: 73 MMHG | BODY MASS INDEX: 23.04 KG/M2 | SYSTOLIC BLOOD PRESSURE: 126 MMHG | WEIGHT: 130 LBS | HEIGHT: 63 IN | HEART RATE: 76 BPM

## 2023-05-26 DIAGNOSIS — M89.8X5 OTHER SPECIFIED DISORDERS OF BONE, THIGH: ICD-10-CM

## 2023-05-26 PROCEDURE — 27220 TREAT HIP SOCKET FRACTURE: CPT | Mod: LT

## 2023-05-26 PROCEDURE — 73552 X-RAY EXAM OF FEMUR 2/>: CPT | Mod: 26,LT

## 2023-05-26 PROCEDURE — 99213 OFFICE O/P EST LOW 20 MIN: CPT | Mod: 57

## 2023-05-31 VITALS
BODY MASS INDEX: 23.04 KG/M2 | WEIGHT: 130 LBS | DIASTOLIC BLOOD PRESSURE: 73 MMHG | SYSTOLIC BLOOD PRESSURE: 126 MMHG | HEIGHT: 63 IN

## 2023-05-31 NOTE — HISTORY OF PRESENT ILLNESS
[de-identified] : The patient comes in today with complaints of left thigh pain.  She states she was taking her dog to the vet and he is an 85 pound dog.  She states the dog pulled her and she fell to the floor.  She has left thigh pain.\par

## 2023-05-31 NOTE — DISCUSSION/SUMMARY
[de-identified] : At this time, due to left femur pain and possible fracture of distal femur, I recommended an MRI to rule out a fracture.  She is going to be placed on walker and she is going to be toe touch weightbearing until we get confirmation of the MRI.  We will do a TTM with her.\par

## 2023-05-31 NOTE — PHYSICAL EXAM
[de-identified] : Right Knee: Range of Motion in Degrees	\par 	                  Claimant:	Normal:	\par Flexion Active	  135 	                135-degrees	\par Flexion Passive	  135	                135-degrees	\par Extension Active	  0-5	                0-5-degrees	\par Extension Passive	  0-5	                0-5-degrees	\par \par No weakness to flexion/extension.  No evidence of instability in the AP plane or varus or valgus stress.  Negative  Lachman.  Negative pivot shift.  Negative anterior drawer test.  Negative posterior drawer test.  Negative Chang.  Negative Apley grind.  No medial or lateral joint line tenderness.  No tenderness over the medial and lateral facet of the patella.  No patellofemoral crepitations.  No lateral tilting patella.  No patella apprehension.  No crepitation in the medial and lateral femoral condyle.  No proximal or distal swelling, edema or tenderness.  No gross neurological or vascular deficits distally.  No intra-articular swelling.  2+ DP and PT pulses. No varus or valgus malalignment.  Skin is intact.  No rashes, scars or lesions.\par \par Right Calf:  Nontender at the calf.  \par \par Right Ankle: Range of Motion in Degrees:\par 	                                  Claimant:	Normal:	\par Dorsiflexion (Active)	  40-degrees	40-degrees	\par Dorsiflexion (Passive)	  40-degrees	40-degrees	\par Plantar (Active)	                  40-degrees	40-degrees	\par Plantar (Passive)	                  40-degrees	40-degrees	\par Inversion (Active)	                  30-degrees	30-degrees	\par Inversion (Passive)	                  30-degrees	30-degrees	\par Eversion  (Active)	                  20-degrees	20-degrees	\par Eversion (Passive) 	                  20-degrees	20-degrees	\par \par No weakness in dorsiflexion, plantar flexion, inversion or eversion.  Normal sensation.  No tenderness over the medial or lateral ligaments.  No tenderness over the DLES.  No evidence of instability.  Negative anterior drawer sign.  No medial or lateral bony tenderness.  No proximal fibular tenderness.  No anterior, posterior, medial or lateral tendon tenderness.  No intra-articular swelling.  No extra-articular swelling, edema or tenderness.  No tenderness over the plantar aspect of the os calcis.  2+ DP and PT pulses. Skin is intact.  No rashes, scars or lesions.  \par \par Left Lower Extremity:  She is tender along the distal end of the femur.  Minimal groin pain.  No trochanteric bursitis.  No calf tenderness.  No pelvic pain at all.  She needs to lift her leg in order to get up on the table.  She states she cannot even put pressure to get up from a seated to a standing position.  \par  [de-identified] : Gait and Station:  Ambulating with a slightly antalgic to antalgic gait.  Normal Station.  [de-identified] : Appearance:  Well developed, well-nourished female in no acute distress.\par   [de-identified] : Radiographs, two views of the left femur taken in the office today, reveal a possible fracture of the distal femur.\par

## 2023-05-31 NOTE — ADDENDUM
[FreeTextEntry1] : This note was written by Jadon Priest on 05/31/2023, acting as a scribe for MARIO PARK, ARIK/L, PA

## 2023-06-05 ENCOUNTER — APPOINTMENT (OUTPATIENT)
Dept: ORTHOPEDIC SURGERY | Facility: CLINIC | Age: 78
End: 2023-06-05
Payer: MEDICARE

## 2023-06-05 PROCEDURE — 99024 POSTOP FOLLOW-UP VISIT: CPT

## 2023-06-21 ENCOUNTER — APPOINTMENT (OUTPATIENT)
Dept: ORTHOPEDIC SURGERY | Facility: CLINIC | Age: 78
End: 2023-06-21
Payer: MEDICARE

## 2023-06-21 DIAGNOSIS — S32.810A MULTIPLE FRACTURES OF PELVIS WITH STABLE DISRUPTION OF PELVIC RING, INITIAL ENCOUNTER FOR CLOSED FRACTURE: ICD-10-CM

## 2023-06-21 DIAGNOSIS — S32.416A: ICD-10-CM

## 2023-06-21 PROCEDURE — 99024 POSTOP FOLLOW-UP VISIT: CPT

## 2023-06-22 PROBLEM — S32.810A CLOSED PELVIC RING FRACTURE, INITIAL ENCOUNTER: Status: ACTIVE | Noted: 2020-11-18

## 2023-06-22 PROBLEM — S32.416A: Status: ACTIVE | Noted: 2023-05-26

## 2023-06-27 VITALS
SYSTOLIC BLOOD PRESSURE: 126 MMHG | HEIGHT: 63 IN | DIASTOLIC BLOOD PRESSURE: 73 MMHG | WEIGHT: 130 LBS | BODY MASS INDEX: 23.04 KG/M2

## 2023-06-27 NOTE — HISTORY OF PRESENT ILLNESS
[de-identified] : The patient comes in today for her left hip stating she is feeling much better.

## 2023-06-27 NOTE — DISCUSSION/SUMMARY
[de-identified] : At this time, the patient is doing well with the left hip. She will start on home therapeutic modalities. She will follow up in four weeks should her symptoms warrant.

## 2023-06-27 NOTE — PHYSICAL EXAM
[de-identified] : Left Hip: \par Range of Motion in Degrees:\par 	                                 Claimant:	   Normal:	\par Flexion (Active) 	                 120 	   120-degrees	\par Flexion (Passive)	                 120	   120-degrees	\par Extension (Active)	                 -30	   -30-degrees	\par Extension (Passive)	 -30	   -30-degrees	\par Abduction (Active)	                 45-50	   15-53-sresyqz	\par Abduction (Passive)	 45-50	   69-12-yaidbms	\par Adduction (Active)  	 20-30	   83-44-pesxwhg	\par Adduction (Passive)	 20-30	   91-66-jabxsbf	\par Internal Rotation (Active) 	 35	   35-degrees	\par Internal Rotation (Passive)	 35	   35-degrees	\par External Rotation (Active)	 45	   45-degrees	\par External Rotation (Passive)	 45	   45-degrees	\par \par No tenderness with internal or external rotation or axial load.  No tenderness to palpation over the greater trochanter.  Negative Trendelenburg.  No tenderness with resisted abduction.  No weakness to flexion, extension, abduction or adduction.  No evidence of instability.  No motor or sensory deficits.  2+ DP and PT pulses.  Skin is intact.  No scars, rashes or lesions.  \par   [de-identified] : Ambulating with a slightly antalgic to antalgic gait.  Station:  Normal.  [de-identified] : Appearance:  Well-developed, well-nourished female in no acute distress.\par

## 2023-06-27 NOTE — ADDENDUM
[FreeTextEntry1] : This note was written by Christiana Gutierrez on 06/27/2023 acting as a scribe for ERYN ZHENG III, MD

## 2023-07-19 ENCOUNTER — APPOINTMENT (OUTPATIENT)
Dept: ORTHOPEDIC SURGERY | Facility: CLINIC | Age: 78
End: 2023-07-19
Payer: MEDICARE

## 2023-07-19 VITALS
SYSTOLIC BLOOD PRESSURE: 126 MMHG | BODY MASS INDEX: 23.04 KG/M2 | WEIGHT: 130 LBS | HEIGHT: 63 IN | DIASTOLIC BLOOD PRESSURE: 77 MMHG | HEART RATE: 80 BPM

## 2023-07-19 DIAGNOSIS — S32.810D MULTIPLE FRACTURES OF PELVIS WITH STABLE DISRUPTION OF PELVIC RING, SUBSEQUENT ENCOUNTER FOR FRACTURE WITH ROUTINE HEALING: ICD-10-CM

## 2023-07-19 PROCEDURE — 99212 OFFICE O/P EST SF 10 MIN: CPT | Mod: 24

## 2023-07-20 PROBLEM — S32.810D CLOSED PELVIC RING FRACTURE WITH ROUTINE HEALING, SUBSEQUENT ENCOUNTER: Status: ACTIVE | Noted: 2020-12-09

## 2023-07-20 NOTE — DISCUSSION/SUMMARY
[de-identified] : The patient presents with a healed pelvic ring fracture.  At this time, I instructed her on home therapeutic modalities.  She will follow up with me on an as needed basis.

## 2023-07-20 NOTE — ADDENDUM
[FreeTextEntry1] : This note was written by Jessica De León on 07/20/2023 acting as scribe for Naresh Coombs III, MD

## 2023-07-20 NOTE — PHYSICAL EXAM
[Normal] : Gait: normal [de-identified] : Right Hip:\par Hip: Range of Motion in Degrees:\par 	                                 Claimant:	   Normal:	\par Flexion (Active) 	                 120 	   120-degrees	\par Flexion (Passive)	                 120	   120-degrees	\par Extension (Active)	                 -30	   -30-degrees	\par Extension (Passive)	 -30	   -30-degrees	\par Abduction (Active)	                 45-50	   42-27-jzxppxf	\par Abduction (Passive)	 45-50	   91-56-yimxego	\par Adduction (Active)  	 20-30	   32-81-azgbkoy	\par Adduction (Passive)	 20-30	   15-88-irowyid	\par Internal Rotation (Active) 	 35	   35-degrees	\par Internal Rotation (Passive)	 35	   35-degrees	\par External Rotation (Active)	 45	   45-degrees	\par External Rotation (Passive)	 45	   45-degrees	\par \par No tenderness with internal or external rotation or axial load.  No tenderness to palpation over the greater trochanter.  Negative Trendelenburg.  No tenderness with resisted abduction.  No weakness to flexion, extension, abduction or adduction.  No evidence of instability.  No motor or sensory deficits.  2+ DP and PT pulses.  Skin is intact.  No scars, rashes or lesions.  \par  \par Left Hip:\par Hip: Range of Motion in Degrees:\par 	                                 Claimant:	   Normal:	\par Flexion (Active) 	                 120 	   120-degrees	\par Flexion (Passive)	                 120	   120-degrees	\par Extension (Active)	                 -30	   -30-degrees	\par Extension (Passive)	 -30	   -30-degrees	\par Abduction (Active)	                 45-50	   84-55-liioxdc	\par Abduction (Passive)	 45-50	   25-18-dekjydi	\par Adduction (Active)  	 20-30	   28-57-xujzenq	\par Adduction (Passive)	 20-30	   03-81-ikkysrj	\par Internal Rotation (Active) 	 35	   35-degrees	\par Internal Rotation (Passive)	 35	   35-degrees	\par External Rotation (Active)	 45	   45-degrees	\par External Rotation (Passive)	 45	   45-degrees	\par \par No tenderness to palpation over the superior inferior pubic ramus.  No tenderness with internal or external rotation or axial load.  No tenderness to palpation over the greater trochanter.  Negative Trendelenburg.  No tenderness with resisted abduction.  No weakness to flexion, extension, abduction or adduction.  No evidence of instability.  No motor or sensory deficits.  2+ DP and PT pulses.  Skin is intact.  No scars, rashes or lesions.  \par   [de-identified] : Station:  Normal. [de-identified] : Appearance:  Well-developed, well-nourished female in no acute distress.\par

## 2023-08-12 NOTE — ED ADULT NURSE NOTE - NS ED NOTE ABUSE SUSPICION NEGLECT YN
ED Attending Note      Patient : Tere MARTE Mansfield Age: 89 year old Sex: female   MRN: [id number] Encounter Date: 8/12/2023    I participated in the following activities of this patients care: The medical history, the physical exam, medical decision making.  I personally performed: Supervision of the patient's care, the medical history, the physical exam, the medical decision making.  The case was discussed with: The resident  Evaluation and management service: I agree with the evaluation and management decisions made in this patient's care.  Results interpretation: I agree with the study interpretation in this patient's care, I agree with the documentation of the study interpretation.      History     Chief Complaint   Patient presents with   • Syncope         89-year-old female with a history of HTN, status post AAA repair, SBO status post colostomy, depression presents with a syncopal episode today.  Patient was sitting on the toilet when she became unresponsive, she did not fall or hit her head.  No seizure activity noted.  Patient was unresponsive for a few minutes, complaining of some vague abdominal pain around her colostomy site.  Unclear if this is chronic as caretaker seems to think she always complains of this.  She was admitted over a year ago for syncope and dizziness, though left AGAINST MEDICAL ADVICE prior to her cardiac evaluation.  No associated chest pain or shortness of breath.          ALLERGIES:  No Known Allergies    Current Discharge Medication List      Prior to Admission Medications    Details   pantoprazole (PROTONIX) 40 MG tablet Take 40 mg by mouth daily.      aspirin 81 MG tablet Take 81 mg by mouth daily.      LORazepam (ATIVAN) 0.5 MG tablet Take 0.5 mg by mouth at bedtime.      sertraline (ZOLOFT) 50 MG tablet Take 25 mg by mouth daily.             Current Discharge Medication List          Past History       Past Medical History:   Diagnosis Date   • Essential (primary) hypertension    •  History of creation of ostomy (CMD)        Past Surgical History:   Procedure Laterality Date   •  section, low transverse         No family history on file.    Social History     Tobacco Use   • Smoking status: Former     Current packs/day: 0.00   • Smokeless tobacco: Never   Vaping Use   • Vaping Use: never used   Substance Use Topics   • Alcohol use: Not Currently   • Drug use: Never       Review of Systems   Constitutional: Negative for activity change, appetite change, chills, diaphoresis, fatigue and fever.   HENT: Negative for congestion, ear pain, facial swelling, hearing loss, nosebleeds, rhinorrhea, sinus pressure, sinus pain, sneezing, sore throat, trouble swallowing and voice change.    Eyes: Negative for photophobia, pain, discharge, redness, itching and visual disturbance.   Respiratory: Negative for cough, chest tightness, shortness of breath, wheezing and stridor.    Cardiovascular: Negative for chest pain, palpitations and leg swelling.   Gastrointestinal: Positive for abdominal pain. Negative for abdominal distention, blood in stool, constipation, diarrhea, nausea, rectal pain and vomiting.   Genitourinary: Negative for dysuria, flank pain, frequency, hematuria and urgency.   Musculoskeletal: Negative for back pain, joint swelling, neck pain and neck stiffness.   Skin: Negative for color change, pallor and rash.   Neurological: Positive for syncope. Negative for dizziness, seizures, facial asymmetry, speech difficulty, light-headedness, numbness and headaches.   Psychiatric/Behavioral: Negative for suicidal ideas.       Physical Exam     ED Triage Vitals [23 1127]   ED Triage Vitals Group      Temp 97.7 °F (36.5 °C)      Heart Rate 88      Resp 19      /62      SpO2 90 %      EtCO2 mmHg       Height       Weight       Weight Scale Used       BMI (Calculated)       IBW/kg (Calculated)        Physical Exam  Vitals and nursing note reviewed.   Constitutional:       Appearance:  Normal appearance.   HENT:      Head: Normocephalic and atraumatic.      Mouth/Throat:      Mouth: Mucous membranes are moist.      Neck: Normal range of motion and neck supple.   Eyes:      Extraocular Movements: Extraocular movements intact.      Conjunctiva/sclera: Conjunctivae normal.      Pupils: Pupils are equal, round, and reactive to light.   Cardiovascular:      Rate and Rhythm: Normal rate and regular rhythm.      Pulses: Normal pulses.      Heart sounds: Normal heart sounds. No murmur heard.     No friction rub. No gallop.   Pulmonary:      Effort: Pulmonary effort is normal. No respiratory distress.      Breath sounds: Normal breath sounds. No stridor. No rales.   Chest:      Chest wall: No tenderness.   Abdominal:      General: Abdomen is flat. Bowel sounds are normal. There is no distension.      Palpations: Abdomen is soft. There is no mass.      Tenderness: There is no abdominal tenderness. There is no right CVA tenderness, left CVA tenderness, guarding or rebound.       Musculoskeletal:         General: No swelling, tenderness or deformity. Normal range of motion.   Skin:     General: Skin is warm and dry.      Capillary Refill: Capillary refill takes less than 2 seconds.      Coloration: Skin is not pale.      Findings: No rash.   Neurological:      General: No focal deficit present.      Mental Status: She is alert and oriented to person, place, and time.      Cranial Nerves: No cranial nerve deficit.      Sensory: No sensory deficit.      Motor: No weakness.      Gait: Gait normal.   Psychiatric:         Mood and Affect: Mood normal.         Behavior: Behavior normal.         Thought Content: Thought content normal.         Procedures    Lab Results     Labs Reviewed   COMPREHENSIVE METABOLIC PANEL - Abnormal; Notable for the following components:       Result Value    Glucose 124 (*)     Creatinine 1.49 (*)     Glomerular Filtration Rate 33 (*)     Albumin 3.1 (*)     A/G Ratio 0.9 (*)     All  other components within normal limits   TROPONIN I, HIGH SENSITIVITY - Abnormal; Notable for the following components:    Troponin I, High Sensitivity 250 (*)     All other components within normal limits   CBC WITH AUTOMATED DIFFERENTIAL (PERFORMABLE ONLY) - Abnormal; Notable for the following components:    WBC 11.4 (*)     RBC 2.91 (*)     HGB 8.8 (*)     HCT 26.6 (*)     Absolute Neutrophils 8.8 (*)     All other components within normal limits    Narrative:     This is an appended report.  These results have been appended to a previously verified report.   PROCALCITONIN - Abnormal; Notable for the following components:    Procalcitonin 0.17 (*)     All other components within normal limits   NT PROBNP - Abnormal; Notable for the following components:    NT-proBNP 1,495 (*)     All other components within normal limits   BLOOD GAS, ARTERIAL WITH COOXIMETRY - RESPIRATORY - Abnormal; Notable for the following components:    BASE EXCESS / DEFICIT, ARTERIAL - RESPIRATORY -13 (*)     HCO3, ARTERIAL - RESPIRATORY 14 (*)     PH, ARTERIAL - RESPIRATORY 7.15 (*)     PO2, ARTERIAL - RESPIRATORY 173 (*)     O2 SATURATION, ARTERIAL - RESPIRATORY 100 (*)     CARBOXYHEMOGLOBIN - RESPIRATORY 2.1 (*)     HEMOGLOBIN - RESPIRATORY 6.5 (*)     All other components within normal limits   CALCIUM, IONIZED - RESPIRATORY - Abnormal; Notable for the following components:    CALCIUM, IONIZED - RESPIRATORY 1.61 (*)     All other components within normal limits   GLUCOSE - RESPIRATORY - Abnormal; Notable for the following components:    GLUCOSE - RESPIRATORY 309 (*)     All other components within normal limits   LACTIC ACID, ARTERIAL - RESPIRATORY - Abnormal; Notable for the following components:    LACTIC ACID, ARTERIAL - RESPIRATORY 13.1 (*)     All other components within normal limits   LACTIC ACID VENOUS WITH REFLEX - Normal   POTASSIUM - RESPIRATORY - Normal   SODIUM - RESPIRATORY - Normal   CHLORIDE - RESPIRATORY - Normal   CBC  WITH DIFFERENTIAL    Narrative:     The following orders were created for panel order CBC with Automated Differential.  Procedure                               Abnormality         Status                     ---------                               -----------         ------                     CBC with Automated Dif...[[id number]]  Abnormal            Final result                 Please view results for these tests on the individual orders.   LIGHT BLUE TOP   GREY TOP TUBE   PINK TOP TUBE   PINK TOP TUBE   RED TOP   URINALYSIS & REFLEX MICROSCOPY WITH CULTURE IF INDICATED   TROPONIN I, HIGH SENSITIVITY   LIPID PANEL WITH REFLEX   THYROID STIMULATING HORMONE REFLEX   GLYCOHEMOGLOBIN   TROPONIN I, HIGH SENSITIVITY   TROPONIN I, HIGH SENSITIVITY   TROPONIN I, HIGH SENSITIVITY   BLOOD CULTURE   BLOOD CULTURE      Medications   fentaNYL (SUBLIMAZE) 2,500 mcg/250 mL in sodium chloride 0.9 % infusion (has no administration in time range)   iohexol (OMNIPAQUE 350 INJECT) contrast solution 75 mL (75 mLs Intravenous Given 8/12/23 1240)   lactated ringers bolus 1,000 mL (1,000 mLs Intravenous New Bag 8/12/23 1428)   cefTRIAXone (ROCEPHIN) syringe 2,000 mg (2,000 mg Intravenous Given 8/12/23 1350)   azithromycin (ZITHROMAX) 500 mg in sodium chloride 0.9 % 250 mL IVPB ( Intravenous Infusion Continues to Floor 8/12/23 1359)     EKG Results     EKG Interpretation  Rate: 90  Rhythm: normal sinus rhythm   Abnormality: Nonspecific findings, normal axis. No ST elevation/ depression.     EKG tracing interpreted by ED physician    Radiology Results     CT ABDOMEN PELVIS W CONTRAST   Final Result   *   No acute process in the abdomen or pelvis.   *   Intervally worsened L1 compression fracture compared to 2017, now with   near complete height loss.  Additional age indeterminate compression   fractures of the L3 and T12 vertebral bodies, with approximately 50% and   less than 25% height loss, respectively.  Please correlate with  paraspinal   tenderness.   *   1 cm hypodensity in the pancreatic head, minimally enlarged compared to   2017.  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  May consider follow-up   imaging in one year.   *   Uncomplicated cholelithiasis.   *   Colonic diverticulosis without diverticulitis.  Postoperative changes   related to partial colectomy with left lower quadrant ostomy.   *   Infrarenal abdominal aortic aneurysm status post endovascular repair,   measuring up to 4 cm, decreased in size compared to 2017.   *   Additional findings as above.      Dictated by: Kevin Babcock MD   Dictated on: 8/12/2023 12:45 PM       ISANCHEZ M.D., have reviewed the images and report and concur with   these findings interpreted by Kevin Babcock MD.      Electronically Signed by: SANCHEZ CAMACHO M.D.    Signed on: 8/12/2023 1:42 PM    Workstation ID: [id number]      XR CHEST AP OR PA   Final Result       Overlying EKG leads.  Stable heart size and mediastinal contours.    Atherosclerotic calcifications of the thoracic aorta.  There is a new   airspace/interstitial opacity involving the left upper to midlung field   since prior chest radiograph.  This may reflect infectious process.    Increased interstitial opacities are also noted within the medial right   upper lung field.  No large pleural effusion or pneumothorax.  Radiographic   follow-up to ensure resolution is advised.         Electronically Signed by: SANCHEZ CAMACHO M.D.    Signed on: 8/12/2023 12:08 PM    Workstation ID: [id number]      XR CHEST AP OR PA    (Results Pending)       [facility]   MDM  Number of Diagnoses or Management Options  Abdominal pain in female  Elevated troponin level  Pneumonia due to infectious organism, unspecified laterality, unspecified part of lung  Syncope, unspecified syncope type  Diagnosis management comments: Patient is afebrile, hemodynamically stable and in no distress.  She is initially hypotensive though this corrected without  aggressive intervention.  Patient remained asymptomatic, pleasantly demented.  Caretaker later revealed that she may possibly have had a cough.  Chest x-ray is consistent with pneumonia.  Patient was started on antibiotics.  She was also noted to have an elevated troponin level, admitted to telemetry especially given the syncopal episode.  Patient is agreeable to the plan, admitted to the service team in stable condition.      ED Course        Clinical Impression     ED Diagnoses     Diagnosis Comment Associated Orders       Final diagnoses    Syncope, unspecified syncope type -- --    Pneumonia due to infectious organism, unspecified laterality, unspecified part of lung -- --    Abdominal pain in female -- --    Elevated troponin level -- --           Disposition        Admit 8/12/2023  1:31 PM  Telemetry Bed?: Yes  Admitting Physician: LYUBOV BOYLE [[id number]]  Is this a telephone or verbal order?: This is a telephone order from the admitting physician      ERIKA Park MD   8/12/2023 12:39 PM                      ARNOLDO Park MD  08/12/23 1557     No

## 2023-09-01 ENCOUNTER — APPOINTMENT (OUTPATIENT)
Dept: UROLOGY | Facility: CLINIC | Age: 78
End: 2023-09-01
Payer: MEDICARE

## 2023-09-01 VITALS
OXYGEN SATURATION: 99 % | HEIGHT: 63 IN | SYSTOLIC BLOOD PRESSURE: 127 MMHG | RESPIRATION RATE: 15 BRPM | BODY MASS INDEX: 22.68 KG/M2 | WEIGHT: 128 LBS | DIASTOLIC BLOOD PRESSURE: 80 MMHG | HEART RATE: 66 BPM

## 2023-09-01 DIAGNOSIS — R31.0 GROSS HEMATURIA: ICD-10-CM

## 2023-09-01 PROCEDURE — 99214 OFFICE O/P EST MOD 30 MIN: CPT

## 2023-09-01 NOTE — HISTORY OF PRESENT ILLNESS
[FreeTextEntry1] : 9/1/2023: Patient has noted these issues for some period of time. Recently she noted hematuria. She denies a history of urinary tract issues in the past. Finds these changes to be quite recent.

## 2023-09-01 NOTE — ASSESSMENT
[FreeTextEntry1] : Patient with voiding issues and hematuria  Recommendations  Urine is sent for culture analysis and cytology and a CAT scan is ordered. She will follow up with cystourethroscopy and urodynamics in the near future.

## 2023-09-01 NOTE — END OF VISIT
[FreeTextEntry3] : Documented by oSnam Herring acting as a scribe for Dr. Clark Addison. 9/1/23   All medical record entries made by the Scribe were at my, Dr. Clark Addison, direction and personally dictated by me on 9/1/23. I have reviewed the chart and agree that the record accurately reflects my personal performance of the history, physical exam, assessment and plan. I have also personally directed, reviewed, and agreed with the chart.

## 2023-09-01 NOTE — REVIEW OF SYSTEMS
[Chills] : chills [Dry Eyes] : dryness of the eyes [see HPI] : see HPI [Urine Infection (bladder/kidney)] : bladder/kidney infection [Pain during urination] : pain during urination [Blood in urine that you can see] : blood visible in urine [Leakage of urine with straining, coughing, laughing] : leakage of urine with straining, coughing, laughing [Joint Pain] : joint pain [Joint Swelling] : joint swelling [Itching] : itching [Negative] : Heme/Lymph

## 2023-09-03 LAB
APPEARANCE: CLEAR
BACTERIA UR CULT: NORMAL
BACTERIA: NEGATIVE /HPF
BILIRUBIN URINE: NEGATIVE
BLOOD URINE: NEGATIVE
CAST: 0 /LPF
COLOR: YELLOW
EPITHELIAL CELLS: 0 /HPF
GLUCOSE QUALITATIVE U: NEGATIVE MG/DL
KETONES URINE: NEGATIVE MG/DL
LEUKOCYTE ESTERASE URINE: ABNORMAL
MICROSCOPIC-UA: NORMAL
NITRITE URINE: NEGATIVE
PH URINE: 6.5
PROTEIN URINE: NEGATIVE MG/DL
RED BLOOD CELLS URINE: 0 /HPF
SPECIFIC GRAVITY URINE: 1
UROBILINOGEN URINE: 0.2 MG/DL
WHITE BLOOD CELLS URINE: 1 /HPF

## 2023-09-05 LAB — URINE CYTOLOGY: NORMAL

## 2023-09-07 ENCOUNTER — OUTPATIENT (OUTPATIENT)
Dept: OUTPATIENT SERVICES | Facility: HOSPITAL | Age: 78
LOS: 1 days | End: 2023-09-07
Payer: MEDICARE

## 2023-09-07 ENCOUNTER — APPOINTMENT (OUTPATIENT)
Dept: CT IMAGING | Facility: CLINIC | Age: 78
End: 2023-09-07
Payer: MEDICARE

## 2023-09-07 DIAGNOSIS — N31.8 OTHER NEUROMUSCULAR DYSFUNCTION OF BLADDER: ICD-10-CM

## 2023-09-07 PROCEDURE — 74178 CT ABD&PLV WO CNTR FLWD CNTR: CPT

## 2023-09-07 PROCEDURE — 74178 CT ABD&PLV WO CNTR FLWD CNTR: CPT | Mod: 26,MH

## 2023-09-12 ENCOUNTER — NON-APPOINTMENT (OUTPATIENT)
Age: 78
End: 2023-09-12

## 2023-10-16 ENCOUNTER — APPOINTMENT (OUTPATIENT)
Dept: UROLOGY | Facility: CLINIC | Age: 78
End: 2023-10-16
Payer: MEDICARE

## 2023-10-16 VITALS
RESPIRATION RATE: 14 BRPM | BODY MASS INDEX: 22.5 KG/M2 | HEIGHT: 63 IN | DIASTOLIC BLOOD PRESSURE: 80 MMHG | HEART RATE: 72 BPM | WEIGHT: 127 LBS | TEMPERATURE: 97.4 F | SYSTOLIC BLOOD PRESSURE: 146 MMHG | OXYGEN SATURATION: 100 %

## 2023-10-16 DIAGNOSIS — N31.8 OTHER NEUROMUSCULAR DYSFUNCTION OF BLADDER: ICD-10-CM

## 2023-10-16 PROCEDURE — 51741 ELECTRO-UROFLOWMETRY FIRST: CPT

## 2023-10-16 PROCEDURE — 51728 CYSTOMETROGRAM W/VP: CPT

## 2023-10-16 PROCEDURE — 51798 US URINE CAPACITY MEASURE: CPT | Mod: 59

## 2023-10-16 PROCEDURE — 51784 ANAL/URINARY MUSCLE STUDY: CPT

## 2023-10-16 PROCEDURE — 51797 INTRAABDOMINAL PRESSURE TEST: CPT

## 2023-10-16 RX ORDER — OXYBUTYNIN CHLORIDE 10 MG/1
10 TABLET, EXTENDED RELEASE ORAL
Qty: 90 | Refills: 3 | Status: ACTIVE | COMMUNITY
Start: 2023-10-16 | End: 1900-01-01

## 2023-11-22 ENCOUNTER — APPOINTMENT (OUTPATIENT)
Dept: ORTHOPEDIC SURGERY | Facility: CLINIC | Age: 78
End: 2023-11-22
Payer: MEDICARE

## 2023-11-22 VITALS
HEIGHT: 63 IN | SYSTOLIC BLOOD PRESSURE: 159 MMHG | DIASTOLIC BLOOD PRESSURE: 79 MMHG | BODY MASS INDEX: 22.5 KG/M2 | HEART RATE: 62 BPM | WEIGHT: 127 LBS

## 2023-11-22 PROCEDURE — 73030 X-RAY EXAM OF SHOULDER: CPT | Mod: 50

## 2023-11-22 PROCEDURE — 99213 OFFICE O/P EST LOW 20 MIN: CPT

## 2024-01-25 ENCOUNTER — APPOINTMENT (OUTPATIENT)
Dept: ORTHOPEDIC SURGERY | Facility: CLINIC | Age: 79
End: 2024-01-25
Payer: MEDICARE

## 2024-01-25 VITALS
DIASTOLIC BLOOD PRESSURE: 73 MMHG | WEIGHT: 127 LBS | SYSTOLIC BLOOD PRESSURE: 117 MMHG | HEIGHT: 63 IN | BODY MASS INDEX: 22.5 KG/M2 | HEART RATE: 81 BPM

## 2024-01-25 DIAGNOSIS — M19.012 PRIMARY OSTEOARTHRITIS, RIGHT SHOULDER: ICD-10-CM

## 2024-01-25 DIAGNOSIS — M19.011 PRIMARY OSTEOARTHRITIS, RIGHT SHOULDER: ICD-10-CM

## 2024-01-25 PROCEDURE — 99212 OFFICE O/P EST SF 10 MIN: CPT

## 2024-01-27 ENCOUNTER — NON-APPOINTMENT (OUTPATIENT)
Age: 79
End: 2024-01-27

## 2024-01-30 PROBLEM — M19.011 PRIMARY OSTEOARTHRITIS OF BOTH SHOULDERS: Status: ACTIVE | Noted: 2023-11-22

## 2024-01-30 NOTE — HISTORY OF PRESENT ILLNESS
[de-identified] : The patient comes in today for her bilateral shoulders. She states she feels markedly improved.

## 2024-01-30 NOTE — DISCUSSION/SUMMARY
[de-identified] : At this time, due to osteoarthritis of the bilateral shoulders, I recommended home therapeutic modalities. She will follow up with me on an as needed basis.

## 2024-01-30 NOTE — PHYSICAL EXAM
[de-identified] : Right Shoulder: Range of Motion in Degrees:          Claimant:   Normal:  Abduction (Active)    160   180 degrees  Abduction (Passive)   160   180 degrees  Forward elevation (Active):  160   180 degrees  Forward elevation (Passive):  160   180 degrees  External rotation (Active):  30     45 degrees  External rotation (Passive):  30     45 degrees  Internal rotation (Active):   S-1   L-1  Internal rotation (Passive):  S-1   L-1   Diffuse crepitations and tenderness throughout range of motion. Pain and swelling throughout range of motion, more significant at extremes. No motor weakness to internal rotation, external rotation or abduction in the scapular plane. Negative crank test. Negative OBriens test. Negative Speeds test. Negative Yergasons test. Negative cross arm test. No tenderness to palpation at the AC joint. Negative Lopez sign. Negative Neers sign. Negative apprehension. Negative sulcus sign. No gross neurological or vascular deficits distally. Skin is intact. No rashes, scars or lesions. 2+ radial and ulnar pulses. No extra-articular swelling or tenderness.  Left Shoulder: Range of Motion in Degrees:          Claimant:   Normal:  Abduction (Active)    160   180 degrees  Abduction (Passive)   160   180 degrees  Forward elevation (Active):  160   180 degrees  Forward elevation (Passive):  160   180 degrees  External rotation (Active):  30     45 degrees  External rotation (Passive):  30     45 degrees  Internal rotation (Active):   S-1   L-1  Internal rotation (Passive):  S-1   L-1   Diffuse crepitations and tenderness throughout range of motion. Pain and swelling throughout range of motion, more significant at extremes. No motor weakness to internal rotation, external rotation or abduction in the scapular plane. Negative crank test. Negative OBriens test. Negative Speeds test. Negative Yergasons test. Negative cross arm test. No tenderness to palpation at the AC joint. Negative Lopez sign. Negative Neers sign. Negative apprehension. Negative sulcus sign. No gross neurological or vascular deficits distally. Skin is intact. No rashes, scars or lesions. 2+ radial and ulnar pulses. No extra-articular swelling or tenderness.   [de-identified] : Ambulating with a normal gait. [de-identified] : Appearance:  Well-developed, well-nourished female in no acute distress.

## 2024-01-30 NOTE — ADDENDUM
[FreeTextEntry1] : This note was written by Christiana Gutierrez on 01/30/2024 acting as a scribe for ERYN ZHENG III, MD

## 2024-10-19 NOTE — ED ADULT NURSE NOTE - NSFALLRSKOUTCOME_ED_ALL_ED
Problem: At Risk for Falls  Goal: Patient does not fall  Outcome: Monitoring/Evaluating progress  Goal: Patient takes action to control fall-related risks  Outcome: Monitoring/Evaluating progress     Problem: At Risk for Injury Due to Fall  Goal: Patient does not fall  Outcome: Monitoring/Evaluating progress  Goal: Takes action to control condition specific risks  Outcome: Monitoring/Evaluating progress  Goal: Verbalizes understanding of fall-related injury personal risks  Description: Document education using the patient education activity  Outcome: Monitoring/Evaluating progress     Problem: Diabetes  Goal: Achieves glycemic balance  Description: Goal is to maintain blood sugar within range with no episodes of hypoglycemia  Outcome: Monitoring/Evaluating progress  Goal: Verbalizes/demonstrates understanding of NEW diagnosis of diabetes and management  Description: Document on Patient Education Activity  Outcome: Monitoring/Evaluating progress  Goal: Verbalizes understanding of diabetes management including how to use HbA1C to evaluate status of blood sugar over time (Diabetes is NOT a new diagnosis)  Description: Diabetes Education  Outcome: Monitoring/Evaluating progress  Goal: Demonstrates ability to self-administer insulin  Description: Document on Patient Education Activity  Outcome: Monitoring/Evaluating progress      Fall Risk

## 2025-04-04 ENCOUNTER — EMERGENCY (EMERGENCY)
Facility: HOSPITAL | Age: 80
LOS: 1 days | Discharge: HOME | End: 2025-04-04
Attending: PERSONAL EMERGENCY RESPONSE ATTENDANT
Payer: MEDICARE

## 2025-04-04 VITALS
DIASTOLIC BLOOD PRESSURE: 78 MMHG | OXYGEN SATURATION: 95 % | SYSTOLIC BLOOD PRESSURE: 142 MMHG | TEMPERATURE: 98 F | HEART RATE: 61 BPM | RESPIRATION RATE: 17 BRPM

## 2025-04-04 VITALS
RESPIRATION RATE: 18 BRPM | TEMPERATURE: 98 F | HEART RATE: 77 BPM | DIASTOLIC BLOOD PRESSURE: 73 MMHG | SYSTOLIC BLOOD PRESSURE: 174 MMHG | OXYGEN SATURATION: 100 %

## 2025-04-04 LAB
ALBUMIN SERPL ELPH-MCNC: 4.1 G/DL — SIGNIFICANT CHANGE UP (ref 3.3–5)
ALP SERPL-CCNC: 72 U/L — SIGNIFICANT CHANGE UP (ref 40–120)
ALT FLD-CCNC: 30 U/L — SIGNIFICANT CHANGE UP (ref 10–45)
ANION GAP SERPL CALC-SCNC: 12 MMOL/L — SIGNIFICANT CHANGE UP (ref 5–17)
APTT BLD: 29.7 SEC — SIGNIFICANT CHANGE UP (ref 24.5–35.6)
AST SERPL-CCNC: 30 U/L — SIGNIFICANT CHANGE UP (ref 10–40)
BASOPHILS # BLD AUTO: 0.05 K/UL — SIGNIFICANT CHANGE UP (ref 0–0.2)
BASOPHILS NFR BLD AUTO: 0.9 % — SIGNIFICANT CHANGE UP (ref 0–2)
BILIRUB SERPL-MCNC: 0.2 MG/DL — SIGNIFICANT CHANGE UP (ref 0.2–1.2)
BUN SERPL-MCNC: 14 MG/DL — SIGNIFICANT CHANGE UP (ref 7–23)
CALCIUM SERPL-MCNC: 10.3 MG/DL — SIGNIFICANT CHANGE UP (ref 8.4–10.5)
CHLORIDE SERPL-SCNC: 99 MMOL/L — SIGNIFICANT CHANGE UP (ref 96–108)
CO2 SERPL-SCNC: 24 MMOL/L — SIGNIFICANT CHANGE UP (ref 22–31)
CREAT SERPL-MCNC: 0.73 MG/DL — SIGNIFICANT CHANGE UP (ref 0.5–1.3)
EGFR: 85 ML/MIN/1.73M2 — SIGNIFICANT CHANGE UP
EGFR: 85 ML/MIN/1.73M2 — SIGNIFICANT CHANGE UP
EOSINOPHIL # BLD AUTO: 0.12 K/UL — SIGNIFICANT CHANGE UP (ref 0–0.5)
EOSINOPHIL NFR BLD AUTO: 2.1 % — SIGNIFICANT CHANGE UP (ref 0–6)
GLUCOSE SERPL-MCNC: 103 MG/DL — HIGH (ref 70–99)
HCT VFR BLD CALC: 32.3 % — LOW (ref 34.5–45)
HGB BLD-MCNC: 9.9 G/DL — LOW (ref 11.5–15.5)
IMM GRANULOCYTES NFR BLD AUTO: 0.2 % — SIGNIFICANT CHANGE UP (ref 0–0.9)
INR BLD: 0.96 RATIO — SIGNIFICANT CHANGE UP (ref 0.85–1.16)
LYMPHOCYTES # BLD AUTO: 1.37 K/UL — SIGNIFICANT CHANGE UP (ref 1–3.3)
LYMPHOCYTES # BLD AUTO: 23.5 % — SIGNIFICANT CHANGE UP (ref 13–44)
MCHC RBC-ENTMCNC: 23.3 PG — LOW (ref 27–34)
MCHC RBC-ENTMCNC: 30.7 G/DL — LOW (ref 32–36)
MCV RBC AUTO: 76 FL — LOW (ref 80–100)
MONOCYTES # BLD AUTO: 0.45 K/UL — SIGNIFICANT CHANGE UP (ref 0–0.9)
MONOCYTES NFR BLD AUTO: 7.7 % — SIGNIFICANT CHANGE UP (ref 2–14)
NEUTROPHILS # BLD AUTO: 3.84 K/UL — SIGNIFICANT CHANGE UP (ref 1.8–7.4)
NEUTROPHILS NFR BLD AUTO: 65.6 % — SIGNIFICANT CHANGE UP (ref 43–77)
NRBC BLD AUTO-RTO: 0 /100 WBCS — SIGNIFICANT CHANGE UP (ref 0–0)
PLATELET # BLD AUTO: 320 K/UL — SIGNIFICANT CHANGE UP (ref 150–400)
POTASSIUM SERPL-MCNC: 4.3 MMOL/L — SIGNIFICANT CHANGE UP (ref 3.5–5.3)
POTASSIUM SERPL-SCNC: 4.3 MMOL/L — SIGNIFICANT CHANGE UP (ref 3.5–5.3)
PROT SERPL-MCNC: 7.2 G/DL — SIGNIFICANT CHANGE UP (ref 6–8.3)
PROTHROM AB SERPL-ACNC: 10.9 SEC — SIGNIFICANT CHANGE UP (ref 9.9–13.4)
RBC # BLD: 4.25 M/UL — SIGNIFICANT CHANGE UP (ref 3.8–5.2)
RBC # FLD: 15.8 % — HIGH (ref 10.3–14.5)
SODIUM SERPL-SCNC: 135 MMOL/L — SIGNIFICANT CHANGE UP (ref 135–145)
TROPONIN T, HIGH SENSITIVITY RESULT: <6 NG/L — SIGNIFICANT CHANGE UP (ref 0–51)
WBC # BLD: 5.84 K/UL — SIGNIFICANT CHANGE UP (ref 3.8–10.5)
WBC # FLD AUTO: 5.84 K/UL — SIGNIFICANT CHANGE UP (ref 3.8–10.5)

## 2025-04-04 PROCEDURE — 0042T: CPT

## 2025-04-04 PROCEDURE — 84295 ASSAY OF SERUM SODIUM: CPT

## 2025-04-04 PROCEDURE — 99285 EMERGENCY DEPT VISIT HI MDM: CPT | Mod: 25

## 2025-04-04 PROCEDURE — 82803 BLOOD GASES ANY COMBINATION: CPT

## 2025-04-04 PROCEDURE — 70498 CT ANGIOGRAPHY NECK: CPT | Mod: MC

## 2025-04-04 PROCEDURE — 96375 TX/PRO/DX INJ NEW DRUG ADDON: CPT | Mod: XU

## 2025-04-04 PROCEDURE — 83605 ASSAY OF LACTIC ACID: CPT

## 2025-04-04 PROCEDURE — 99291 CRITICAL CARE FIRST HOUR: CPT

## 2025-04-04 PROCEDURE — 80053 COMPREHEN METABOLIC PANEL: CPT

## 2025-04-04 PROCEDURE — 0042T: CPT | Mod: MC

## 2025-04-04 PROCEDURE — 85610 PROTHROMBIN TIME: CPT

## 2025-04-04 PROCEDURE — 84484 ASSAY OF TROPONIN QUANT: CPT

## 2025-04-04 PROCEDURE — 96374 THER/PROPH/DIAG INJ IV PUSH: CPT | Mod: XU

## 2025-04-04 PROCEDURE — 70450 CT HEAD/BRAIN W/O DYE: CPT

## 2025-04-04 PROCEDURE — 70496 CT ANGIOGRAPHY HEAD: CPT | Mod: 26

## 2025-04-04 PROCEDURE — 82330 ASSAY OF CALCIUM: CPT

## 2025-04-04 PROCEDURE — 85014 HEMATOCRIT: CPT

## 2025-04-04 PROCEDURE — 93005 ELECTROCARDIOGRAM TRACING: CPT

## 2025-04-04 PROCEDURE — 82435 ASSAY OF BLOOD CHLORIDE: CPT

## 2025-04-04 PROCEDURE — 85730 THROMBOPLASTIN TIME PARTIAL: CPT

## 2025-04-04 PROCEDURE — 70450 CT HEAD/BRAIN W/O DYE: CPT | Mod: 26,XU

## 2025-04-04 PROCEDURE — 82947 ASSAY GLUCOSE BLOOD QUANT: CPT

## 2025-04-04 PROCEDURE — 70498 CT ANGIOGRAPHY NECK: CPT | Mod: 26

## 2025-04-04 PROCEDURE — 93010 ELECTROCARDIOGRAM REPORT: CPT

## 2025-04-04 PROCEDURE — 85018 HEMOGLOBIN: CPT

## 2025-04-04 PROCEDURE — 82962 GLUCOSE BLOOD TEST: CPT

## 2025-04-04 PROCEDURE — 85025 COMPLETE CBC W/AUTO DIFF WBC: CPT

## 2025-04-04 PROCEDURE — 84132 ASSAY OF SERUM POTASSIUM: CPT

## 2025-04-04 PROCEDURE — 70496 CT ANGIOGRAPHY HEAD: CPT | Mod: MC

## 2025-04-04 RX ORDER — KETOROLAC TROMETHAMINE 30 MG/ML
15 INJECTION, SOLUTION INTRAMUSCULAR; INTRAVENOUS ONCE
Refills: 0 | Status: DISCONTINUED | OUTPATIENT
Start: 2025-04-04 | End: 2025-04-04

## 2025-04-04 RX ORDER — ACETAMINOPHEN 500 MG/5ML
1000 LIQUID (ML) ORAL ONCE
Refills: 0 | Status: COMPLETED | OUTPATIENT
Start: 2025-04-04 | End: 2025-04-04

## 2025-04-04 RX ORDER — METOCLOPRAMIDE HCL 10 MG
10 TABLET ORAL ONCE
Refills: 0 | Status: COMPLETED | OUTPATIENT
Start: 2025-04-04 | End: 2025-04-04

## 2025-04-04 RX ADMIN — Medication 400 MILLIGRAM(S): at 17:04

## 2025-04-04 RX ADMIN — Medication 1000 MILLILITER(S): at 16:30

## 2025-04-04 RX ADMIN — Medication 10 MILLIGRAM(S): at 16:30

## 2025-04-04 RX ADMIN — KETOROLAC TROMETHAMINE 15 MILLIGRAM(S): 30 INJECTION, SOLUTION INTRAMUSCULAR; INTRAVENOUS at 16:30

## 2025-04-04 NOTE — ED ADULT NURSE NOTE - IS THE PATIENT ABLE TO BE SCREENED?
Detail Level: Detailed Quality 431: Preventive Care And Screening: Unhealthy Alcohol Use - Screening: Patient not identified as an unhealthy alcohol user when screened for unhealthy alcohol use using a systematic screening method Quality 110: Preventive Care And Screening: Influenza Immunization: Influenza Immunization previously received during influenza season Quality 226: Preventive Care And Screening: Tobacco Use: Screening And Cessation Intervention: Patient screened for tobacco use and is an ex/non-smoker Quality 111:Pneumonia Vaccination Status For Older Adults: Pneumococcal vaccine (PPSV23) administered on or after patient’s 60th birthday and before the end of the measurement period Quality 130: Documentation Of Current Medications In The Medical Record: Current Medications Documented Yes

## 2025-04-04 NOTE — ED PROVIDER NOTE - IV ALTEPLASE INCLUSION HIDDEN
Goal Outcome Evaluation:              Outcome Evaluation: Patient currently resting abed, no distress noted. Patient ambulates with standby assist and walker. Pastient able to make needs known but is very hard of hearing. Patient stating that he wants to go home soon.                              show

## 2025-04-04 NOTE — ED ADULT TRIAGE NOTE - BEFAST SCREENING
Patient is asking for a refill for cough medicine.    Please advise    [facility] on [address]    Positive

## 2025-04-04 NOTE — ED ADULT NURSE NOTE - OBJECTIVE STATEMENT
79y F PMH thyroid disease  presents to the ED c/o diff wording finding, headache. Pt reports around 1230/1245 she was at work and was having diffculty naming her coworkers. Pt was able to identify the coworkers but was unable to name them. Pt currently reporting 3/10 headache pain. Pt denies numbness, tingling, weakness. code stroke activated. MD Casarez, neuro at bedside. Comfort and safety maintained. 79y F PMH thyroid disease  presents to the ED c/o diff wording finding, headache. Pt reports around 1230/1245 she was at work and was having diffculty naming her coworkers. Pt was able to identify the coworkers but was unable to name them. Pt currently reporting 3/10 headache pain. Pt denies numbness, tingling, weakness. Pt reports improvement of symptoms of diff wording upon ED arrival. code stroke activated. MD Casarez, neuro at bedside. Comfort and safety maintained.

## 2025-04-04 NOTE — ED PROVIDER NOTE - CLINICAL SUMMARY MEDICAL DECISION MAKING FREE TEXT BOX
79-year-old female past medical history significant for hypothyroidism presents emergency department as a code stroke.  Patient seen evaluated at bedside with daughter, neurology for code stroke evaluation.  Patient states that around 130 she developed headache, blurry vision, word finding/word recall issues, remembers entire incident.  Incident lasted around 30 minutes, patient now presents with temporal headache with no blurry vision or word finding difficulties.  Patient was in her regular health prior to incident, denies trauma.  Patient not on blood thinners or aspirin.  Physical exam NIH H score 0.  Will get code stroke labs, imaging concern for TIA versus ischemic versus hemorrhagic stroke however unlikely secondary to physical exam and resolution of symptoms versus complex migraine.  Dispo pending workup and neuro recs

## 2025-04-04 NOTE — ED PROVIDER NOTE - CARE PLAN
Principal Discharge DX:	Headache   1 Principal Discharge DX:	Headache  Secondary Diagnosis:	HTN (hypertension)  Secondary Diagnosis:	Anemia

## 2025-04-04 NOTE — ED ADULT TRIAGE NOTE - CHIEF COMPLAINT QUOTE
"blurry vision, couldn't remember first names of her coworkers and headache that started around one o'clock which has now resolved." denies blood thinners

## 2025-04-04 NOTE — ED PROVIDER NOTE - PATIENT PORTAL LINK FT
You can access the FollowMyHealth Patient Portal offered by  by registering at the following website: [email or web address]. By joining [employer]’s FollowMyHealth portal, you will also be able to view your health information using other applications (apps) compatible with our system.

## 2025-04-04 NOTE — ED PROVIDER NOTE - ATTENDING CONTRIBUTION TO CARE
Attending MD Haskins:  I performed a history and physical exam of the patient and discussed their management with the resident. I reviewed the resident's note and agree with the documented findings and plan of care. My medical decison making and observations are found above.    Attending MD Haskins.  Agree with above.  Pt is a 80 yo fem with no sig pmhx who presents to ED after she was at work at ~1340 and developed generally blurry vision, memory deficit isolated to name recall of colleagues she knows well (no speech deficit either production or comprehension) and no memory formation deficit.  Recalls entire event.  No falls/injuries.  No thunderclap HA.  No AC on board.  Neuro afocal on arrival to ED.  Sxs now resolved.  Pt endorses resolving sxs ~30 min post onset.  No similar previous events.  Neuro rec management for likely complex migraine and not recommending advanced imaging or management for TIA/stroke sxs.  Pt well appearing and back to baseline.    Critical care billing:  Upon my evaluation, this patient had a high probability of imminent or life-threatening deterioration due to stroke-like symptoms, which required my direct attention, intervention, and personal management.  The patient has a  medical condition that impairs one or more vital organ systems.  Frequent personal assessment and adjustment of medical interventions was performed.      I have personally provided 45 minutes of critical care time exclusive of time spent on separately billable procedures. Time includes review of laboratory data, radiology results, discussion with consultants, patient and family; monitoring for potential decompensation, as well as time spent retrieving data and reviewing the chart and documenting the visit. Interventions were performed as documented above.

## 2025-04-04 NOTE — CONSULT NOTE ADULT - SUBJECTIVE AND OBJECTIVE BOX
Neurology - Consult Note    -  Spectra: [phone] ([facility]), [phone] ([facility])  -    HPI: Patient JENI DYSON is a 79y (1945) RH woman with a PMHx significant for hypothyroidism presenting as a code stroke for HA with blurry vision. LKW 12:40 pm, then patient developed gradual, nonpositional HA with difficulty forming sentences. Blurry vision and speech difficulty resolved within 30 minutes. HA is generalized and was 8/10, now 2/10. Patient did not try medications. Not on AC/AP. Denies weakness, numbness, vision loss, dizziness, nausea, vomiting, prior stroke history. Has had headaches before without visual/speech symptoms.     NIHSS:0  preMRS:0  Not a tenecteplase candidate given low NIHSS   Not a thrombectomy candidate given no LVO       Review of Systems:   All other review of systems is negative unless indicated above.    Allergies:  penicillins (Unknown)      PMHx/PSHx/Family Hx: As above, otherwise see below   Hypothyroidism        Social Hx:  No current use of tobacco, alcohol, or illicit drugs    Medications:  MEDICATIONS  (STANDING):    MEDICATIONS  (PRN):      Vitals:  T(C): 36.6 (04-04-25 @ 15:12), Max: 36.6 (04-04-25 @ 15:12)  HR: 77 (04-04-25 @ 15:12) (77 - 77)  BP: 174/73 (04-04-25 @ 15:12) (174/73 - 174/73)  RR: 18 (04-04-25 @ 15:12) (18 - 18)  SpO2: 100% (04-04-25 @ 15:12) (100% - 100%)    Physical Examination:   General - NAD  Cardiovascular - Peripheral pulses palpable, no edema  Eyes - Fundoscopy not performed due to safety precautions     Neurologic Exam:  Mental status - Awake, Alert, Oriented to person, place, and time. Speech fluent, repetition and naming intact. Follows simple and complex commands. Attention/concentration, recent and remote memory (including registration and recall), and fund of knowledge intact    Cranial nerves - PERRLA, VFF, EOMI, face sensation (V1-V3) intact b/l, facial strength intact without asymmetry b/l, hearing intact b/l, palate with symmetric elevation, trapezius  5/5 strength b/l, tongue midline on protrusion with full lateral movement    Motor - Normal bulk and tone throughout. No pronator drift.  Strength testing            Deltoid      Biceps      Triceps     Wrist Extension    Wrist Flexion     Interossei         R            5                 5               5                     5                              5                        5                 5  L             5                 5               5                     5                              5                        5                 5              Hip Flexion    Hip Extension    Knee Flexion    Knee Extension    Dorsiflexion    Plantar Flexion  R              5                           5                       5                           5                            5                          5  L              5                           5                        5                           5                            5                          5    Sensation - Light touch/temperature intact throughout    DTR's -  did not assess given focused neuro exam     Coordination - Finger to Nose intact b/l. No tremors appreciated    Gait and station - Normal casual gait.     Labs:          CAPILLARY BLOOD GLUCOSE      POCT Blood Glucose.: 105 mg/dL (04 Apr 2025 15:15)          CSF:                  Radiology:

## 2025-04-04 NOTE — ED PROVIDER NOTE - PROGRESS NOTE DETAILS
Charisma Casarez MD (PGY-3 EM): gait wnl, discussed w/ patient return precautions and need for outpatient neuro f/u and pmd f/u for htn. discussed return precautions. patient feels improved. discussed h.9 denies hematemesis, dark stool, rectal bleeding, recommended pmd f/u. patient agreeable to plan.

## 2025-04-04 NOTE — CONSULT NOTE ADULT - ASSESSMENT
79 RHF with no vascular risk factors presenting with generalized HA, now improved, and resolved b/l blurry vision and word finding difficulty. Neuro exam normal. Neuroimaging     Impression:     Recommendations   [] First line: recommend migraine medications (to be given all together at the same time): ketorolac (Toradol) 30mg IV q8h PRN (or acetaminophen 1g IV q8h PRN), metoclopramide (Reglan) 10mg q8h PRN, diphenhydramine (Benadryl) 25mg IV q8h PRN. Please repeat at least 2-3 cycles for medications to be effective.  [] IV hydration, Mg 2g IV x1       79 RHF with no vascular risk factors presenting with generalized HA, now improved, and resolved b/l blurry vision and word finding difficulty. Neuro exam normal. Neuroimaging neg.     Impression: Improving HA with resolved b/l blurry vision and word finding difficulty likely tension HA vs migraine    Recommendations   [] First line: recommend migraine medications (to be given all together at the same time): ketorolac (Toradol) 30mg IV q8h PRN (or acetaminophen 1g IV q8h PRN), metoclopramide (Reglan) 10mg q8h PRN, diphenhydramine (Benadryl) 25mg IV q8h PRN. Please repeat at least 2-3 cycles for medications to be effective.  [] IV hydration, Mg 2g IV x1  [] Blood pressure control   [] Can f/u outpatient with Dr. Gandhi     D/w stroke fellow under supervision of stroke attending

## 2025-04-04 NOTE — ED PROVIDER NOTE - NSFOLLOWUPINSTRUCTIONS_ED_ALL_ED_FT
please follow up with neurology. below is a phone number below to make an appointment    today you were found to have high blood pressure, please follow up with your primary care doctor for continued management.    you were found to be anemic to 9.9. you stated you have no bleeding, and were recommend to follow up with your primary care doctor for further testing.     SEEK IMMEDIATE MEDICAL CARE IF YOU HAVE ANY OF THE FOLLOWING SYMPTOMS: severe headache, confusion, chest pain, abdominal pain, vomiting, rectal bleeding, dark stool, vomiting blood, or shortness of breath.      Headache    A headache is pain or discomfort felt around the head or neck area. The specific cause of a headache may not be found as there are many types including tension headaches, migraine headaches, and cluster headaches. Watch your condition for any changes. Things you can do to manage your pain include taking over the counter and prescription medications as instructed by your health care provider, lying down in a dark quiet room, limiting stress, getting regular sleep, and refraining from alcohol and tobacco products.    SEEK IMMEDIATE MEDICAL CARE IF YOU HAVE ANY OF THE FOLLOWING SYMPTOMS: fever, vomiting, stiff neck, loss of vision, problems with speech, muscle weakness, loss of balance, trouble walking, passing out, or confusion.    YOU MAY TAKE   Ibuprofen 400mg, every 4-6hrs, as needed for pain  Tylenol 650mg, every 6 hrs, as needed for pain.

## 2025-04-04 NOTE — ED PROVIDER NOTE - CARE PROVIDER_API CALL
Samuel Gandhi  Neurology  [address], [address]  [place] [address]  Phone: [phone]  Fax: [phone]  Follow Up Time:

## 2025-04-04 NOTE — STROKE CODE NOTE - NSRESATTESTFELSPV_ED_ALL_CORE_FT
Samuel Gandhi Hemigard Postcare Instructions: The HEMIGARD strips are to remain completely dry for at least 5-7 days.

## 2025-04-04 NOTE — ED ADULT TRIAGE NOTE - BEFAST LAST WELL KNOWN
[de-identified] : 
Known

## 2025-05-06 NOTE — ED PROVIDER NOTE - PRO INTERPRETER NEED 2
The Service to  Spine Care order requested on 4/14/2025 has been removed as, unable to contact. Ordering provider has been notified.  Please contact patient, if further communication is needed.    English

## 2025-06-24 ENCOUNTER — APPOINTMENT (OUTPATIENT)
Dept: NEUROLOGY | Facility: CLINIC | Age: 80
End: 2025-06-24
Payer: MEDICARE

## 2025-06-24 VITALS
SYSTOLIC BLOOD PRESSURE: 138 MMHG | HEIGHT: 63 IN | DIASTOLIC BLOOD PRESSURE: 80 MMHG | HEART RATE: 65 BPM | WEIGHT: 124 LBS | TEMPERATURE: 98.2 F | BODY MASS INDEX: 21.97 KG/M2

## 2025-06-24 PROCEDURE — 99204 OFFICE O/P NEW MOD 45 MIN: CPT

## 2025-07-07 ENCOUNTER — NON-APPOINTMENT (OUTPATIENT)
Age: 80
End: 2025-07-07